# Patient Record
Sex: FEMALE | Race: WHITE
[De-identification: names, ages, dates, MRNs, and addresses within clinical notes are randomized per-mention and may not be internally consistent; named-entity substitution may affect disease eponyms.]

---

## 2017-02-05 NOTE — ED PHYSICIAN DOCUMENTATION
Dyspnea





- HISTORIAN


Historian: patient, child





- HPI


Chief Complaint: Dyspnea


Additional Information: 





[facility] sob 0200hrs w/inc anxiety hx chf-=concern re daughter moving.metal aortic 

valve repl 1-23-17 [facility] lance well-chf sig better since then.  had marked chf and 

leg edema prior-better since.


Duration: continues in ED, better


Initiating Event: other (aforementioned anxiety).  denies: upper respiratory 

illness, out of meds


Severity: moderate


Exacerbated By: exertion, laying flat


Associated Symptoms: denies: fever, sweating, chest pain, chest discomfort, 

bloody cough, productive cough, heart racing, leg pain





- ROS


CONST: other (as above)


EYES/ENT: none


GI/: none


NEURO/PSYCH: denies: headache


MS/SKIN/LYMPH: none, other (still eccymopsis s/p surgical proceedures)





- PAST HX


Lung Disease: none


Cardiac Disease: CHF, other (aortic valve)


PE Risk Factors: hypertension, recent surgery.  denies: hx of PE


Surgeries/Procedures: other (aortic valve repl)


Other History: diabetes Type 1, other (sjogrens syndrome depression anxiety).  

denies: CVA, kidney failure, TIA


Allergies/Adverse Reactions: 


 Allergies











Allergy/AdvReac Type Severity Reaction Status Date / Time


 


Sulfa (Sulfonamide Allergy Unknown Generalized Verified 02/05/17 11:31





Antibiotics)   Swelling  














Home Medications: 


 Ambulatory Orders











 Medication  Instructions  Recorded


 


Carvedilol [Coreg] 6.25 mg PO BID 02/05/17


 


Citalopram Hydrobromide 20 mg PO DAILY 02/05/17





[Citalopram HBr]  


 


Clopidogrel Bisulfate [Clopidogrel] 75 mg PO DAILY 02/05/17


 


Glimepiride [Amaryl] 2 mg PO 0730 02/05/17


 


Lisinopril [Lisinopril] 5 mg PO DAILY 02/05/17


 


Lorazepam [Lorazepam] 0.5 mg PO PRN PRN 02/05/17


 


Metformin HCl [Metformin HCl ER] 1,000 mg PO BID 02/05/17


 


Tramadol HCl [Ultram] 50 mg PO PRN PRN 02/05/17














- SOCIAL HX


Smoking History: non-smoker


Alcohol Use: none


Drug Use: none





- FAMILY HX


Family History: no significant history





- VITAL SIGNS


Vital Signs: 





 Vital Signs











Temp Pulse Resp BP Pulse Ox


 


    72      108/57   98 


 


    02/05/17 11:28     11/27/16 11:00  02/05/17 11:28














- REVIEWED ASSESSMENTS


Nursing Assessment  Reviewed: Yes


Vitals Reviewed: Yes





ED Results Lab/Radiology





- Radiology


Radiology Impressions: 





rt lung base atelectasis vs infiltrate





- Orders


Orders: 





 ED Orders











 Category Date Time Status


 


 Continuous EKG monitoring Q30M Care  02/05/17 11:28 Ordered


 


 Continuous Pulse Oximetry Q30M Care  02/05/17 11:28 Ordered


 


 Place Saline Lock/IV NOW Care  02/05/17 11:28 Ordered


 


 CHEST P.A.&LAT 2 VIEWS [RAD] Stat Exams  02/05/17 Ordered


 


 CBC/PLATELET/DIFF Routine Lab  02/05/17 11:28 Ordered


 


 CMP Routine Lab  02/05/17 11:28 Ordered


 


 CREATINE KINASE Routine Lab  02/05/17 11:28 Ordered


 


 NT-proBNP Stat Lab  02/05/17 Ordered


 


 TROPONIN I (cTnI) Stat Lab  02/05/17 11:28 Ordered


 


 Oxygen Daily Oxygen  02/05/17 11:30 Ordered


 


 Oxygen Daily Oxygen  02/05/17 11:30 Ordered


 


 EKG WITH COMPARISON Stat Ther  02/05/17 11:28 Ordered














Dyspnea Physical Exam





- EXAM


General Appearance: mild distress, moderate distress


EENT: eye inspection normal


Neck: No: nml inspection (eccymosis s/p surgery slight NVD)


Respiratory: rales (alonzo rt and post base).  No: breath sounds nml


Skin: color nml.  No: no rash, cyanosis, diaphoresis


Extremities: no edema (very slight)


Neuro/Psych: oriented x3, motor nml, sensation nml, mood/affect nml





Discharge


Clincal Impression: 


 Acute exacerbation of CHF (congestive heart failure), Dehydration, 

Uncontrolled diabetes mellitus





Referrals: 


Wyatt Montalvo MD [STAFF PHYSICIAN] - 2 Days


Home Medications: 


Ambulatory Orders





Carvedilol [Coreg] 6.25 mg PO BID 02/05/17 


Citalopram Hydrobromide [Citalopram HBr] 20 mg PO DAILY 02/05/17 


Clopidogrel Bisulfate [Clopidogrel] 75 mg PO DAILY 02/05/17 


Glimepiride [Amaryl] 2 mg PO 0730 02/05/17 


Lisinopril [Lisinopril] 5 mg PO DAILY 02/05/17 


Lorazepam [Lorazepam] 0.5 mg PO PRN PRN 02/05/17 


Metformin HCl [Metformin HCl ER] 1,000 mg PO BID 02/05/17 


Tramadol HCl [Ultram] 50 mg PO PRN PRN 02/05/17 








Comments: 





rev case w/ DR VILLANUEVA.  WILL SEND HOME MEDS ADJUSTMENT, DIETARY DISCRETION,  F/U 

SOON DR VILLANUEVA or rted


Condition: Good


Disposition: 01 HOME, SELF-CARE


Decision to Admit: NO


Decision Time: 13:20

## 2017-02-05 NOTE — DIAGNOSTIC IMAGING REPORT
[facility]

[id number] [facility].[address]. [address]

 

 

 

 

Report Submission Date: 2017 12:11:36 PM CST

Patient       Study

Name:   EVELIA JAIMES       Date:   2017 11:50:24 AM CST

MRN:          Modality Type:   CR

Gender:   F       Description:   CHEST

:   36       Institution:   [facility]

Physician:   AMINA LANTIGUA DO            

 

 

Chest 2 views 



History: Shortness of breath 



Findings: Cardiomegaly, aortic valve replacement, reverse right shoulder 
arthroplasty right hemidiaphragm eventration, and minimal right basilar 
atelectasis are observed. There is no confluent infiltrate or pleural effusion. 
Calcified granulomas are present. 



Impression: Postoperative changes, mild right basilar atelectasis, and 
cardiomegaly.

 

Electronically signed on 2017 12:11:36 PM CST by:

Manuel APARICIO

## 2017-02-13 NOTE — HISTORY AND PHYSICAL REPORT
History of Present Illnes





- History of Present Illness


Reason for Visit: atrial fib with RVR


History of Present Illness: 





Patient is a 77yo white female who presented to the office today with 

complaints of anxiety and fatigue. Patient states that since she has had her 

heart valve replaced she has not felt well. Seemed to be anxious all the time. 

Is not sleeping well. This AM she notice that her heart was fluttering at 

times. Denies any syncope or near syncope symptoms, chest pain/pressure, 

dyspnea or cough/wheezing. Patient was noted to have some tachycardia and 

irregular heart rate. Pulse to ascultation was 132. Patient was felt to be in 

atrial fib with RVR and admitted to the hospital for further evaluation and 

treatment. 





- Past Medical History


Cardiac: HTN, Hyperlipidemia, Aortic stenosis.  denies: CAD, CHF


Gastrointestinal: GERD


Heme/Onc: Other (DVT after hip surgery 9/14.)


Psych: Anxiety, Depression


Musculoskeletal: Osteoarthritis, Other (Sojogren's syndrome, s/p DVT)


Rheumatologic: Other (Sjogren syndrom, diverticulosis, )


Renal/: Other (kidney stones)


Endocrine: Diabetes, Other (diabetic neuropathy)





- Past Surgical History


Past Surgical History: Appendectomy, Cholecystectomy, Cataract Removal, Total 

Knee Replacement (bilaterally), Other (shoulder surgery, aortic valve 

replacement, lower back surgery, carpal tunnel release, AP bladder repair.)





- Past Social History


Smoke: No


Occupation: retired, manager of gas station


Alcohol: None


Drugs: None


Lives: With Family ([facility])





- Health Maintenance


Health Maintenance: Tetanus (2011), Pneumococcal Vaccine (2014), Mammogram (Aug 

11), DEXA


Pneumonia Vaccine: Yes


Resuscitation Status: 


Resusciation Status





Resuscitation Status             Full Code














- Unable to Obtain History


Unable to Obtain: No





Review of Systems





- Review of Systems


Constitutional: Weakness (generalized).  negative: Fever, Chills


Eyes: negative: pain, vision change


ENT: negative: Ear Pain, Ear Discharge, Nose Pain, Nose Discharge, Nose 

Congestion, Mouth Pain, Mouth Swelling, Throat Pain, Throat Swelling


Respiratory: Cough, Dry, Shortness of Breath.  negative: Hemoptysis, SOB with 

Excertion, Pleuritic Pain, Sputum, Wheezing


Cardiovascular: Palpitations.  negative: Chest Pain, Orthopnea, Paroxysmal Noc. 

Dyspnea, Edema, Light Headedness


Gastrointestinal: Constipation.  negative: Nausea, Vomiting, Abdominal Pain, 

Diarrhea, Melena, Hematochezia


Genitourinary: negative: Dysuria, Frequency, Hematuria


Musculoskeletal: Back Pain (mild).  negative: Neck Pain, Shoulder Pain, Arm Pain


Skin: negative: Rash


Neurological: negative: Weakness, Numbness, Incoordination, Change in Speech, 

Confusion, Seizures





- Medications/Allergies


Allergies/Adverse Reactions: 


 Allergies











Allergy/AdvReac Type Severity Reaction Status Date / Time


 


Sulfa (Sulfonamide Allergy Unknown Generalized Verified 02/13/17 12:06





Antibiotics)   Swelling  














Home Medications: 


 Home Medications





Aspirin [Heraclio] 81 mg PO DAILY 02/13/17 


Cholecalciferol [Vitamin D-3] 1,000 unit PO DAILY 02/13/17 


Cholecalciferol [Vitamin D-3] 2,000 unit PO HS 02/13/17 


Dexlansoprazole [Dexilant] 60 mg PO DAILY 02/13/17 


Docusate Sodium [Colace] 100 - 200 mg PO DAILY 02/13/17 


Furosemide [Lasix] 20 mg  02/13/17 


Gabapentin [Neurontin] 300 mg PO 09 02/13/17 


Gabapentin [Neurontin] 600 mg PO HS 02/13/17 


[place]-3S/Dha/Epa/Fish Oil [Fish Oil 1,200 mg Softgel] 1 each PO D 02/13/17 


Pravastatin Sodium [Pravachol] 80 mg PO HS 02/13/17 


Prenatal Vit/Iron Fumarate/FA [Prenatal Tablet] 1 each PO D 02/13/17 











Current Inpatient Medications: 


 Current Inpatient Medications





Carvedilol (Coreg)  6.25 mg PO BS [facility]


Citalopram Hydrobromide (Celexa)  20 mg PO DAILY [facility]


Clopidogrel Bisulfate (Plavix)  75 mg PO DAILY [facility]


Docusate Sodium (Colace)  200 mg PO DAILY [facility]


Enoxaparin Sodium (Lovenox)  30 mg SQ QD [facility]


   Stop: 02/26/17 13:01


Furosemide (Lasix)  20 mg  [facility]


Glimepiride (Amaryl)  2 mg PO 0730 [facility]


Lisinopril (Prinivil)  5 mg PO HS [facility]


Lorazepam (Ativan)  0.5 mg PO HS PRN


   PRN Reason: Anxiety


Metformin HCl (Glucophage)  1,000 mg PO [address] [facility]


Miscellaneous (Chem Sticks)  1 each  CHEMQID [facility]


Sodium Chloride (Normal Saline Flush)  3 ml IV BID [facility]


Tramadol HCl (Ultram)  50 mg PO Q4 PRN


   PRN Reason: PAIN














Exam





- Exam


Vital Signs: 


 Vital Signs (72 hours)











  02/13/17





  11:09


 


Temperature 97.9 F


 


Pulse Rate [ 79





Bilateral 





Radial] 


 


Pulse Rate [ 79





Left Pulse ox] 


 


Respiratory 18





Rate 


 


Blood Pressure 141/65





[Left Arm] 


 


O2 Sat by Pulse 97





Oximetry 














General: Alert, Oriented to Person, Oriented to Place, Oriented to Time, 

Cooperative, Mild distress


HEENT: Atraumatic, PERRLA, EOMI, Mouth Mucous membr. moist/Pink, Nose Mucous 

membr. moist/Pink, Dentition Normal, Hearing Grossly Normal


Neck: Normal Range of Motion


Carotids: 





WNL


Thyroid: 





WNL


Lungs: Clear to auscultation, Normal air movement, Speaks full Sentences.  No: 

Wheezes, Rales, Rhonchi, Stridor


Cardiovascular: Normal S1, Normal S2, Murmur, Irregularly Irregular, Tachycardia

, Atrial Fib


Murmur: Systolic Murmur


Heart Murmur Grade: II


Abdomen: Normal bowel sounds, Soft, No tenderness, No hepatospenomegaly, No 

masses.  No: Distended


Integumentary: Normal, Pink, Warm, Dry


Extremities: No clubbing, No cyanosis, No edema, Normal pulses, No tenderness/

swelling


Neurological: Normal gait, Normal speech, Strength Equal Bilat, Normal tone, 

Sensation intact, Cranial nerves 3-12 NL, Reflexes 2+


Psych/Mental Status: Mental status NL, Appropriate Affect, Intact Judgment.  No

: Mood NL (anxious, depression)





Assessment/Plan





- Assessment/Plan


(1) Atrial fibrillation


Status: Acute   Current Visit: Yes   


Assessment: 


CHAD2 score of 3, will start anticoagulation. Patient has been advised of the 

risk verses benefits of the anticoalgulation including spontaneous bleeding, 

CVA and death. Patient has taken Xarelto previously and is aware of the risks. 

Will get a cardiac consultation for further evaulation








(2) Diabetes mellitus type 2


Status: Chronic   Current Visit: No   


Assessment: 


Will continue with present home meds and monitor BS








(3) History of deep venous thrombosis


Status: Resolved   Current Visit: No   


Assessment: 


DVT prophylaxis








(4) Sjogren's syndrome


Status: Chronic   Current Visit: No   


Assessment: 


stable








(5) Aortic valve disorder


Status: Resolved   Current Visit: Yes   


Assessment: 


continue to monitor








(6) Anxiety and depression


Status: Acute   Current Visit: Yes   


Assessment: 


will start citalopram and consider some counseling. 








VTE Assessment





- RISK FACTOR SCORE


VTE RISK FACTOR SCORES: AGE OVER 60 YEARS, ANTICIPATED BED CONFINEMENT OR 

IMMOBILIZATION > 24 HOURS, DOCUMENTED HX OF VTE





- RISK


VTE HIGH RISK: SCORE OF 3-4 (RISK PROXIMAL DVT 4-8%)  PROPHYLAXIS NEEDED (On 

eliquis)

## 2017-02-13 NOTE — DIAGNOSTIC IMAGING REPORT
[facility] 

[facility]

[id number] Counts include 234 beds at the [facility] [address] [address]

 

 

 

 

Report Submission Date: 2017 1:20:05 PM CST

Patient       Study

Name:   EVELIA JAIMES       Date:   2017 1:09:59 PM CST

MRN:          Modality Type:   CR

Gender:   F       Description:   CHEST

:   36       Institution:   [facility]

Physician:   [facility]

         

 

 

Chest 2 views 



History: Atrial fibrillation and aortic valve replacement last month 



Findings: The cardiac silhouette is enlarged. Right shoulder replacement, 
minimal right base atelectasis or scar, calcified right thoracic granulomas, 
and percutaneous aortic valve replacement are observed. A mild lower thoracic 
compression deformity is present. Pulmonary vascularity is normal. There is no 
infiltrate or pleural effusion. There has been no significant change since the 
2017 exam. 



Impression: No significant change.

 

Electronically signed on 2017 1:20:05 PM CST by:

Manuel APARICIO

## 2017-02-14 NOTE — INPATIENT PROGRESS NOTE
Subjective





- Required Recertification Statement


I anticipate X number of days because-include discharge plan: 1 day





- Review of Systems


Events since last encounter: 





Patient is doing some better. Still feels weak and is having some gait problems

, feels that she may fall.  DM has been stable. HTN is stable


Pulmonary: Denies: Dyspnea, Cough


Cardiovascular: Palpitations.  Denies: Chest Pain


Gastrointestinal: Denies: Nausea, Vomiting, Abdominal Pain





Objective





- Exam


Vitals and I&O: 


 Vital Signs











Temp  98.4 F   02/14/17 06:00


 


Pulse  76   02/14/17 06:00


 


Resp  16   02/14/17 06:00


 


BP  98/53   02/14/17 06:00


 


Pulse Ox  94   02/14/17 06:00











 Intake & Output











 02/13/17 02/13/17 02/14/17





 11:59 23:59 11:59


 


Intake Total  660 


 


Output Total   600


 


Balance  660 -600


 


Weight 79.379 kg  


 


Intake:   


 


  Oral  660 


 


Output:   


 


  Urine   600


 


Other:   


 


  Voiding Method  Toilet Toilet


 


  # Voids  3 

















- Results


Results: 


 Laboratory Results











WBC  5.10 K/ul (4.00-12.00)   02/13/17  13:05    


 


RBC  4.01 M/ul (3.90-5.20)   02/13/17  13:05    


 


Hgb  11.3 g/dL (12.0-16.0)  L  02/13/17  13:05    


 


Hct  35.3 % (34.5-46.5)   02/13/17  13:05    


 


MCV  88.0 fl (80.0-100.0)   02/13/17  13:05    


 


MCH  28.1 pg (28.0-34.0)   02/13/17  13:05    


 


MCHC  31.9 g/dL (30.0-36.0)   02/13/17  13:05    


 


RDW  14.2 % (11.3-14.3)   02/13/17  13:05    


 


Plt Count  153 K/mm3 (130-400)   02/13/17  13:05    


 


Neut % (Auto)  75.1 % (39.0-79.0)   02/13/17  13:05    


 


Lymph % (Auto)  18.6 % (16.0-50.0)   02/13/17  13:05    


 


Mono % (Auto)  3.7 % (0.0-11.0)   02/13/17  13:05    


 


Eos % (Auto)  1.0 % (0.0-6.8)   02/13/17  13:05    


 


Baso % (Auto)  0.3  (0.0-1.5)   02/13/17  13:05    


 


Neut #  3.8 # k/uL (1.4-7.7)   02/13/17  13:05    


 


Lymph #  1.0 # k/uL (0.6-4.0)   02/13/17  13:05    


 


Mono #  0.2 # k/uL (0.0-0.9)   02/13/17  13:05    


 


Eos #  0.0 # k/uL (0.0-0.6)   02/13/17  13:05    


 


Baso #  0.0 # k/uL (0.0-0.5)   02/13/17  13:05    


 


Reactive Lymphs %  1.4 % (0.0-5.0)   02/13/17  13:05    


 


Reactive Lymphs #  0.1 # k/uL (0.0-0.8)   02/13/17  13:05    


 


Sodium  139 mmol/L (136-145)   02/13/17  13:05    


 


Potassium  4.5 mmol/L (3.5-5.0)   02/13/17  13:05    


 


Chloride  98 mmol/L ()   02/13/17  13:05    


 


Carbon Dioxide  29 mmol/L (20-32)   02/13/17  13:05    


 


BUN  50 mg/dL (10-26)  H  02/13/17  13:05    


 


Creatinine  1.1 mg/dL (0.4-1.5)   02/13/17  13:05    


 


Estimated Creat Clear  62   02/13/17  13:05    


 


Est GFR ( Amer)  > 60  (60-)  02/13/17  13:05    


 


Est GFR (Non-Af Amer)  > 60  (60-)  02/13/17  13:05    


 


Glucose  255 mg/dL (70-99)  H  02/13/17  13:05    


 


Calcium  9.9 mg/dL (8.5-10.5)   02/13/17  13:05    


 


Total Bilirubin  0.3 mg/dL (0.2-1.2)   02/13/17  13:05    


 


AST  20 U/L (0-41)   02/13/17  13:05    


 


ALT  15 U/L (0-45)   02/13/17  13:05    


 


Alkaline Phosphatase  49 U/L ()   02/13/17  13:05    


 


Troponin I  < 0.03 ng/mL (0.03-0.06)  L  02/13/17  13:05    


 


Total Protein  8.5 g/dL (6.0-8.5)   02/13/17  13:05    


 


Albumin  4.6 g/dL (3.0-5.5)   02/13/17  13:05    


 


TSH  2.660 uIU/mL (0.270-4.200)   02/13/17  13:05    

















Assessment/Plan





- Assessment/Plan


(1) PVC (premature ventricular contraction)


Status: Acute   


Assessment: 


Patient seems to be doing some better, less frequent on monitor. Will have 

cardiology see, recent Aortic valve replaced.








(2) Diabetes mellitus type 2


Status: Chronic   


Assessment: 


stable











(3) Sjogren's syndrome


Status: Chronic   





(4) Aortic valve disorder


Status: Resolved   


Assessment: 


stable











(5) Anxiety and depression


Status: Acute   


Assessment: 


stable

## 2017-02-15 NOTE — DISCHARGE SUMMARY
Discharge Summary





- Discharge Sumary


History of Present Illness: 


Patient is a 77yo white female who presented to the office today with 

complaints of anxiety and fatigue. Patient states that since she has had her 

heart valve replaced she has not felt well. Seemed to be anxious all the time. 

Is not sleeping well. This AM she notice that her heart was fluttering at 

times. Denies any syncope or near syncope symptoms, chest pain/pressure, 

dyspnea or cough/wheezing. Patient was noted to have some tachycardia and 

irregular heart rate. Pulse to ascultation was 132. Patient was felt to be in 

atrial fib with RVR and admitted to the hospital for further evaluation and 

treatment. 





Home Medications: 


 Ambulatory Orders











 Medication  Instructions  Recorded


 


Carvedilol [Coreg] 6.25 mg PO BS 02/05/17


 


Citalopram Hydrobromide 20 mg PO DAILY 02/05/17





[Citalopram HBr]  


 


Clopidogrel Bisulfate [Clopidogrel] 75 mg PO DAILY 02/05/17


 


Lisinopril 5 mg PO HS 02/05/17


 


Lorazepam 0.5 mg PO HS PRN 02/05/17


 


Tramadol HCl [Ultram] 50 - 100 mg PO Q4 PRN 02/05/17


 


Aspirin [Heraclio] 81 mg PO DAILY 02/13/17


 


Dexlansoprazole [Dexilant] 60 mg PO DAILY 02/13/17


 


Docusate Sodium [Colace] 100 - 200 mg PO DAILY 02/13/17


 


Gabapentin [Neurontin] 300 mg PO 09 02/13/17


 


Gabapentin [Neurontin] 600 mg PO HS 02/13/17


 


[place]-3S/Dha/Epa/Fish Oil [Fish 1 each PO D 02/13/17





Oil 1,200 mg Softgel]  


 


Pravastatin Sodium [Pravachol] 80 mg PO HS 02/13/17


 


Prenatal Vit/Iron Fumarate/FA 1 each PO D 02/13/17





[Prenatal Tablet]  


 


Ergocalciferol (Vitamin D2) 400 unit PO D 02/15/17





[Vitamin D]  


 


Ondansetron HCl Rapdis [Zofran ODT] 4 mg PO Q8 PRN 02/25/17


 


Prednisone 5 mg PO AM 02/25/17


 


predniSONE [Deltasone] 2.5 mg PO PM 02/25/17


 


Furosemide [Lasix] 20 mg PO D #0  02/27/17











Allergies/Adverse Reactions: 


 Allergies











Allergy/AdvReac Type Severity Reaction Status Date / Time


 


Sulfa (Sulfonamide Allergy Unknown Generalized Verified 02/25/17 13:10





Antibiotics)   Swelling  











Discharge Summary: 





Patient was felt to possibly be in atrial fib with a RVR. When hooked up to 

monitor it was felt that she was in a normal sinus rhythm with frequent PACs.  

Patient was seen by Dr Albarran, orders were reviewed. DM remained stable without 

any hyper or hypoglycemic problems. HTN remained stable. ECHO was normal.  

Patient seem to be doing better on the day of discharge and was discharged in 

stable condition. 





- Final Diagnosis


(1) Sinus arrhythmia


Problems: 


stable rate








(2) Diabetes mellitus type 2


Problems: 


stable on home meds








(3) Sjogren's syndrome


Problems: 


stable, no change








(4) Aortic valve disorder


Problems: 


stable








(5) Anxiety and depression


Problems: 


will start Ativan prn

## 2017-02-24 NOTE — ED PHYSICIAN DOCUMENTATION
General Adult





- HISTORIAN


Historian: patient





- HPI


Stated Complaint: back pain, nausea, confusion per family


Chief Complaint: General Adult


Additional Information: 





single episode of confusion at 3 pm which resolved spontaneously


Onset: hours (4)


Timing: better, gone now


Severity: moderate


Modifying Factors: has recurring problem with uti and associated confusion


Context: was driving in car


Quality: moderate


Further Comments: yes (vomited x 1)


Last known Well Date: 02/23/17


Last Known Well Time: 14:00





- ROS


CONST: no problems


EYES/ENT: none


CVS/RESP: none


GI/: denies: abdominal pain, problems urinating, vomiting, nausea, diarrhea, 

black stools


MS/SKIN/LYMPH: none


NEURO/PSYCH: denies: headache, fainting, dizziness, tingling, numbness, 

difficulty walking, difficulty with speech, anxiety, depression





- PAST HX


Past History: hypertension, other (uti)


Other History: other (gerd, hyperlipidemia)


Surgeries/Procedures: other (ortho)


Immunizations: referred to PCP


Allergies/Adverse Reactions: 


 Allergies











Allergy/AdvReac Type Severity Reaction Status Date / Time


 


Sulfa (Sulfonamide Allergy Unknown Generalized Verified 02/23/17 18:40





Antibiotics)   Swelling  














Home Medications: 


 Ambulatory Orders











 Medication  Instructions  Recorded


 


Carvedilol [Coreg] 6.25 mg PO BS 02/05/17


 


Citalopram Hydrobromide 20 mg PO DAILY 02/05/17





[Citalopram HBr]  


 


Clopidogrel Bisulfate [Clopidogrel] 75 mg PO DAILY 02/05/17


 


Glimepiride [Amaryl] 2 mg PO 0730 02/05/17


 


Lisinopril 5 mg PO HS 02/05/17


 


Lorazepam 0.5 mg PO HS PRN 02/05/17


 


Metformin HCl [Metformin HCl ER] 1,000 mg PO 0717 02/05/17


 


Tramadol HCl [Ultram] 50 - 100 mg PO Q4 PRN 02/05/17


 


Aspirin [Heraclio] 81 mg PO DAILY 02/13/17


 


Dexlansoprazole [Dexilant] 60 mg PO DAILY 02/13/17


 


Docusate Sodium [Colace] 100 - 200 mg PO DAILY 02/13/17


 


Furosemide [Lasix] 20 mg  02/13/17


 


Gabapentin [Neurontin] 300 mg PO 09 02/13/17


 


Gabapentin [Neurontin] 600 mg PO HS 02/13/17


 


[place]-3S/Dha/Epa/Fish Oil [Fish 1 each PO D 02/13/17





Oil 1,200 mg Softgel]  


 


Pravastatin Sodium [Pravachol] 80 mg PO HS 02/13/17


 


Prenatal Vit/Iron Fumarate/FA 1 each PO D 02/13/17





[Prenatal Tablet]  


 


Ergocalciferol (Vitamin D2) 400 unit PO D 02/15/17





[Vitamin D]  














- SOCIAL HX


Smoking History: non-smoker


Alcohol Use: none


Drug Use: none





- FAMILY HX


Family History: No





- VITAL SIGNS


Vital Signs: 





 Vital Signs











Temp Pulse Resp BP Pulse Ox


 


 98.4 F   106 H  16   121/54   91 L


 


 02/23/17 19:25  02/23/17 19:25  02/23/17 19:25  02/23/17 19:25  02/23/17 19:25














- REVIEWED ASSESSMENTS


Nursing Assessment  Reviewed: Yes


Vitals Reviewed: Yes





Progress





- Results/Orders


Results/Orders: 





ua ordered





- Progress


Progress: 





pt given 1 gram rocephin im and zofran 8 mg p.o. in er





Critical Care Note





- Critical Care Note


Total Time (mins): 0





ED Results Lab/Radiology





- Lab Results


Lab Results: 





ua shows uti





- Radiology


Radiology Impressions: 





none ordered





- Orders


Orders: 





 ED Orders











 Category Date Time Status


 


 Ondansetron HCl/Pf [Zofran 4 mg/2 ml] Med  02/23/17 19:09 Discontinued





 8 mg IM NOW ONE   


 


 cefTRIAXone SODIUM [Rocephin] Med  02/23/17 19:08 Discontinued





 1 gm IM NOW ONE   














General Adult Physical Exam





- PHYSICAL EXAM


GENERAL APPEARANCE: no distress


EENT: eye inspection normal, ENT inspection normal, pharynx normal, no signs of 

dehydration


NECK: normal inspection, thyroid normal, supple


RESPIRATORY: no resp distress, chest non-tender, breath sounds normal


CVS: reg rate & rhythm, heart sounds normal, equal pulses, no murmur, no gallop

, PMI nml, no JVD


ABDOMEN: soft, no organomegaly, normal bowel sounds, no abdominal bruit, no 

distension, non-tender


BACK: no CVA tenderness, other (l5-s1 tenderness)


SKIN: warm/dry, normal color


EXTREMITIES: non-tender, normal range of motion, no evidence of injury, no edema


NEURO: oriented X3, CN's nml as tested, motor nml, sensation nml, mood/affect 

nml, cognition normal





Discharge


Clincal Impression: 


Urinary tract infection


Qualifiers:


 Urinary tract infection type: acute cystitis Hematuria presence: without 

hematuria Qualified Code(s): N30.00 - Acute cystitis without hematuria





Referrals: 


Raphael Nieto MD [Primary Care Provider] - 2 Days


Additional Instructions: 


increase fluids


Home Medications: 


Ambulatory Orders





Carvedilol [Coreg] 6.25 mg PO BS 02/05/17 


Citalopram Hydrobromide [Citalopram HBr] 20 mg PO DAILY 02/05/17 


Clopidogrel Bisulfate [Clopidogrel] 75 mg PO DAILY 02/05/17 


Glimepiride [Amaryl] 2 mg PO 0730 02/05/17 


Lisinopril 5 mg PO HS 02/05/17 


Lorazepam 0.5 mg PO HS PRN 02/05/17 


Metformin HCl [Metformin HCl ER] 1,000 mg PO 0717 02/05/17 


Tramadol HCl [Ultram] 50 - 100 mg PO Q4 PRN 02/05/17 


Aspirin [Heraclio] 81 mg PO DAILY 02/13/17 


Dexlansoprazole [Dexilant] 60 mg PO DAILY 02/13/17 


Docusate Sodium [Colace] 100 - 200 mg PO DAILY 02/13/17 


Furosemide [Lasix] 20 mg  02/13/17 


Gabapentin [Neurontin] 300 mg PO 09 02/13/17 


Gabapentin [Neurontin] 600 mg PO HS 02/13/17 


[place]-3S/Dha/Epa/Fish Oil [Fish Oil 1,200 mg Softgel] 1 each PO D 02/13/17 


Pravastatin Sodium [Pravachol] 80 mg PO HS 02/13/17 


Prenatal Vit/Iron Fumarate/FA [Prenatal Tablet] 1 each PO D 02/13/17 


Ergocalciferol (Vitamin D2) [Vitamin D] 400 unit PO D 02/15/17 








Comments: 





discharged with script for levaquin 500 mg 1 p.o. daily x 7 days


Condition: Stable


Disposition: 01 HOME, SELF-CARE


Decision to Admit: NO


Decision Time: 19:20

## 2017-02-25 NOTE — ED PHYSICIAN DOCUMENTATION
Nausea/Vomiting/Diarrhea





- HISTORIAN


Historian: patient





- HPI


Chief Complaint: Nausea,Vomiting,Diarrhea


Onset: days ago (2-3 days)


Duration: constant


Context: denies: out of country travel, bad food


Severity: moderate


Further Comments: yes (Patient has been diagnosed with UTI on Thurs. Has been 

having some nauesa and vomiiting since Thurs. Urinary output has been 

decreased. No abd pain noted. No fever or chills noted. Has been having some 

diaphoresis. Has been able to keep her meds down. Has been having some 

orthostatic symptoms.  No other family members sick. Has been having some 

palpitations.  Mouth has been getting dry.  Gutierrez vomited 4 times today. Has not 

been able to keep anything down. No preciptating or modifying factors.)





- ROS


CONST: sweating.  denies: fever, chills


CVS/RESP: denies: chest pain


GI/: denies: constipation, black stools, bloody urine, bloody stools





- PAST HX


Past History: diabetes Type 2, other (aortic valve stenosis, GERDs, 

hyperlipidemia, )


Surgeries/Procedures: appendectomy, cholecystectomy, other (cataract, bilateral 

total knee replacement, shoulder surgery, Aortic valve replacement)


Immunizations: UTD, referred to PCP


Allergies/Adverse Reactions: 


 Allergies











Allergy/AdvReac Type Severity Reaction Status Date / Time


 


Sulfa (Sulfonamide Allergy Unknown Generalized Verified 02/25/17 13:10





Antibiotics)   Swelling  














Home Medications: 


 Ambulatory Orders











 Medication  Instructions  Recorded


 


Carvedilol [Coreg] 6.25 mg PO BS 02/05/17


 


Citalopram Hydrobromide 20 mg PO DAILY 02/05/17





[Citalopram HBr]  


 


Clopidogrel Bisulfate [Clopidogrel] 75 mg PO DAILY 02/05/17


 


Glimepiride [Amaryl] 1 mg PO 0730 02/05/17


 


Lisinopril 5 mg PO HS 02/05/17


 


Lorazepam 0.5 mg PO HS PRN 02/05/17


 


Metformin HCl [Metformin HCl ER] 1,000 mg PO 0717 02/05/17


 


Tramadol HCl [Ultram] 50 - 100 mg PO Q4 PRN 02/05/17


 


Aspirin [Heraclio] 81 mg PO DAILY 02/13/17


 


Dexlansoprazole [Dexilant] 60 mg PO DAILY 02/13/17


 


Docusate Sodium [Colace] 100 - 200 mg PO DAILY 02/13/17


 


Furosemide [Lasix] 20 mg  02/13/17


 


Gabapentin [Neurontin] 300 mg PO 09 02/13/17


 


Gabapentin [Neurontin] 600 mg PO HS 02/13/17


 


[place]-3S/Dha/Epa/Fish Oil [Fish 1 each PO D 02/13/17





Oil 1,200 mg Softgel]  


 


Pravastatin Sodium [Pravachol] 80 mg PO HS 02/13/17


 


Prenatal Vit/Iron Fumarate/FA 1 each PO D 02/13/17





[Prenatal Tablet]  


 


Ergocalciferol (Vitamin D2) 400 unit PO D 02/15/17





[Vitamin D]  


 


Levofloxacin [Levaquin] 500 mg PO DAILY 02/25/17


 


Ondansetron HCl Rapdis [Zofran Odt] 4 mg PO Q8 PRN 02/25/17


 


Prednisone 5 mg PO AM 02/25/17


 


predniSONE [Deltasone] 2.5 mg PO PM 02/25/17














- SOCIAL HX


Smoking History: non-smoker


Alcohol Use: none


Drug Use: none





- FAMILY HX


Family History: none





- VITAL SIGNS


Vital Signs: 


 Vital Signs











Temp Pulse Resp BP Pulse Ox


 


 96.8 F L  72   18   101/68   98 


 


 02/25/17 14:20  02/25/17 14:30  02/25/17 14:30  02/25/17 14:30  02/25/17 14:30














- REVIEWED ASSESSMENTS


Nursing Assessment  Reviewed: Yes


Vitals Reviewed: Yes





Progress





- EKG/XRAY/CT


EKG: NSR


Comments: occasional PVC, old anterior MI





ED Results Lab/Radiology





- Lab Results


Lab Results: 


 Lab Results











  02/25/17 02/25/17





  13:33 13:33


 


WBC    8.10 K/ul K/ul





    (4.00-12.00) 


 


RBC    3.68 M/ul L M/ul





    (3.90-5.20) 


 


Hgb    10.2 g/dL L g/dL





    (12.0-16.0) 


 


Hct    33.1 % L %





    (34.5-46.5) 


 


MCV    90.0 fl fl





    (80.0-100.0) 


 


MCH    27.7 pg L pg





    (28.0-34.0) 


 


MCHC    30.8 g/dL g/dL





    (30.0-36.0) 


 


RDW    14.2 % %





    (11.3-14.3) 


 


Plt Count    158 K/mm3 K/mm3





    (130-400) 


 


Neut % (Auto)    80.0 % H %





    (39.0-79.0) 


 


Lymph % (Auto)    15.0 % L %





    (16.0-50.0) 


 


Mono % (Auto)    2.9 % %





    (0.0-11.0) 


 


Eos % (Auto)    0.8 % %





    (0.0-6.8) 


 


Baso % (Auto)    0.2 





    (0.0-1.5) 


 


Neut #    6.4 # k/uL # k/uL





    (1.4-7.7) 


 


Lymph #    1.2 # k/uL # k/uL





    (0.6-4.0) 


 


Mono #    0.2 # k/uL # k/uL





    (0.0-0.9) 


 


Eos #    0.1 # k/uL # k/uL





    (0.0-0.6) 


 


Baso #    0.0 # k/uL # k/uL





    (0.0-0.5) 


 


Reactive Lymphs %    1.1 % %





    (0.0-5.0) 


 


Reactive Lymphs #    0.1 # k/uL # k/uL





    (0.0-0.8) 


 


Sodium  136 mmol/L mmol/L  





   (136-145)  


 


Potassium  4.9 mmol/L mmol/L  





   (3.5-5.0)  


 


Chloride  94 mmol/L L mmol/L  





   ()  


 


Carbon Dioxide  31 mmol/L mmol/L  





   (20-32)  


 


BUN  69 mg/dL H mg/dL  





   (10-26)  


 


Creatinine  3.4 mg/dL H mg/dL  





   (0.4-1.5)  


 


Estimated Creat Clear  20   





   


 


Est GFR ( Amer)  17  L   





  (60 - ) 


 


Est GFR (Non-Af Amer)  14  L   





  (60 - ) 


 


Glucose  122 mg/dL H mg/dL  





   (70-99)  


 


Calcium  9.7 mg/dL mg/dL  





   (8.5-10.5)  


 


Total Bilirubin  0.3 mg/dL mg/dL  





   (0.2-1.2)  


 


AST  30 U/L U/L  





   (0-41)  


 


ALT  21 U/L U/L  





   (0-45)  


 


Alkaline Phosphatase  46 U/L U/L  





   ()  


 


Total Protein  8.2 g/dL g/dL  





   (6.0-8.5)  


 


Albumin  4.3 g/dL g/dL  





   (3.0-5.5)  


 


Amylase  62 U/L U/L  





   ()  














- Orders


Orders: 


 ED Orders











 Category Date Time Status


 


 Orthostatics 1T Care  02/25/17 12:50 Active


 


 Place Saline Lock/IV Now Care  02/25/17 12:50 Active


 


 ABDOMEN COMPLETE [RAD] Stat Exams  02/25/17 Taken


 


 AMYLASE Routine Lab  02/25/17 13:33 Completed


 


 CBC/PLATELET/DIFF Routine Lab  02/25/17 13:33 Completed


 


 CMP Routine Lab  02/25/17 13:33 Completed


 


 URINALYSIS Routine Lab  02/25/17 Uncollected


 


 0.9 % Sodium Chloride [Normal Saline] 1,000 ml Med  02/25/17 13:30 Ordered





 IV .Q10H   


 


 Ondansetron HCl/Pf [Zofran 4 mg/2 ml] Med  02/25/17 12:54 Discontinued





 4 mg .ROUTE .STK-MED ONE   


 


 Ondansetron HCl/Pf [Zofran 4 mg/2 ml] Med  02/25/17 13:02 Discontinued





 4 mg IVP NOW ONE   


 


 EKG WITH COMPARISON Routine Ther  02/25/17 Ordered














Nausea Physical Exam





- EXAM


General Appearance: alert, moderate distress


EENT: pharynx normal, dry mucous membranes.  No: no signs of dehydration (dry 

mucous memebranes)


Neck: normal inspection, thyroid normal, supple


Respiratory: no resp distress, chest non-tender, breath sounds normal.  No: 

wheezes, rales, rhonchi


CVS: reg rate & rhythm, heart sounds normal, equal pulses, no murmur, no gallop


Abdomen: non-tender, no organomegaly, tenderness


Back: non-tender, painless ROM, vertebral point-tendernes


Skin: warm/dry, normal color


Extremities: non-tender, normal range of motion, no evidence of injury


Neuro/Psych: oriented X3, CN's nml as tested, motor nml, sensation nml, 

cognition normal.  No: mood/affect nml (depressed)





Discharge


Clincal Impression: 


 Dehydration symptoms, Diabetes mellitus type 2





Urinary tract infection


Qualifiers:


 Urinary tract infection type: acute cystitis Hematuria presence: without 

hematuria Qualified Code(s): N30.00 - Acute cystitis without hematuria





Home Medications: 


Ambulatory Orders





Carvedilol [Coreg] 6.25 mg PO BS 02/05/17 


Citalopram Hydrobromide [Citalopram HBr] 20 mg PO DAILY 02/05/17 


Clopidogrel Bisulfate [Clopidogrel] 75 mg PO DAILY 02/05/17 


Glimepiride [Amaryl] 1 mg PO 0730 02/05/17 


Lisinopril 5 mg PO HS 02/05/17 


Lorazepam 0.5 mg PO HS PRN 02/05/17 


Metformin HCl [Metformin HCl ER] 1,000 mg PO 0717 02/05/17 


Tramadol HCl [Ultram] 50 - 100 mg PO Q4 PRN 02/05/17 


Aspirin [Heraclio] 81 mg PO DAILY 02/13/17 


Dexlansoprazole [Dexilant] 60 mg PO DAILY 02/13/17 


Docusate Sodium [Colace] 100 - 200 mg PO DAILY 02/13/17 


Furosemide [Lasix] 20 mg  02/13/17 


Gabapentin [Neurontin] 300 mg PO 09 02/13/17 


Gabapentin [Neurontin] 600 mg PO HS 02/13/17 


[place]-3S/Dha/Epa/Fish Oil [Fish Oil 1,200 mg Softgel] 1 each PO D 02/13/17 


Pravastatin Sodium [Pravachol] 80 mg PO HS 02/13/17 


Prenatal Vit/Iron Fumarate/FA [Prenatal Tablet] 1 each PO D 02/13/17 


Ergocalciferol (Vitamin D2) [Vitamin D] 400 unit PO D 02/15/17 


Levofloxacin [Levaquin] 500 mg PO DAILY 02/25/17 


Ondansetron HCl Rapdis [Zofran Odt] 4 mg PO Q8 PRN 02/25/17 


Prednisone 5 mg PO AM 02/25/17 


predniSONE [Deltasone] 2.5 mg PO PM 02/25/17 








Condition: Stable


Disposition: 01 HOME, SELF-CARE


Decision to Admit: [id number]


Date of Decison to Admit: 02/25/17


Decision Time: 14:10

## 2017-02-25 NOTE — HISTORY AND PHYSICAL REPORT
History of Present Illnes





- History of Present Illness


Reason for Visit: Nausea/vomiting


History of Present Illness: 


Patient is a 75yo white female with a 3 day history of nausea associated with 

some vomiting over the last three days. Patient states that she has vomited 4 

times today. Nausea is made worse with taking Levaquin and eating. Will vomit 

within minutes post eating or taking medication.  Patient states that she has 

had some low-grade fever and chills.  Patient denies any hematemesis or melena.

  The patient recently was diagnosed with urinary tract infection and was 

started on Levaquin 500 mg by mouth daily.  Patient states that she feels that 

she is getting dehydrated.  Patient has been having some mild orthostatic 

symptoms.  Patient subsequently came to the ED for evaluation. 





- Past Medical History


Cardiac: HTN, Hyperlipidemia, Aortic stenosis.  denies: CAD, CHF


Gastrointestinal: GERD


Heme/Onc: Other (DVT after hip surgery 9/14.)


Psych: Anxiety, Depression


Musculoskeletal: Osteoarthritis, Other (Sojogren's syndrome, s/p DVT)


Rheumatologic: Other (Sjogren syndrom, diverticulosis, )


Renal/: Other (kidney stones)


Endocrine: Diabetes, Other (diabetic neuropathy)





- Past Surgical History


Past Surgical History: Appendectomy, Cholecystectomy, Cataract Removal, Total 

Knee Replacement (bilaterally), Other (shoulder surgery, aortic valve 

replacement, lower back surgery, carpal tunnel release, AP bladder repair.)





- Past Social History


Smoke: No


Occupation: retired, manager of gas station


Alcohol: None


Drugs: None


Lives: With Family ([facility])





- Health Maintenance


Health Maintenance: Tetanus (2011), Pneumococcal Vaccine (2014), Mammogram (Aug 

11), DEXA


Influenza Vaccine: Current for this Influenza Season


Pneumonia Vaccine: Yes


Resuscitation Status: 


Resusciation Status





Resuscitation Status             Full Code














- Unable to Obtain History


Unable to Obtain: No





Review of Systems





- Review of Systems


Constitutional: Fever, Chills, Sweats, Weakness.  negative: Malaise


Eyes: negative: pain, vision change, conjunctivae inflammation


ENT: negative: Ear Pain, Ear Discharge, Nose Pain, Nose Discharge, Nose 

Congestion, Mouth Pain, Mouth Swelling, Throat Pain, Throat Swelling


Respiratory: Shortness of Breath (mild), SOB with Excertion.  negative: Cough, 

Dry, Hemoptysis, Sputum, Wheezing


Cardiovascular: Light Headedness.  negative: Chest Pain, Palpitations, Orthopnea

, Paroxysmal Noc. Dyspnea, Edema


Gastrointestinal: Nausea, Vomiting, Abdominal Pain.  negative: Diarrhea, 

Constipation, Melena, Hematochezia


Genitourinary: Incontinence.  negative: Dysuria, Frequency, Hematuria, Retention


Musculoskeletal: Back Pain.  negative: Neck Pain, Shoulder Pain


Skin: negative: Rash, Lesions, Jaundice


Neurological: negative: Weakness, Numbness, Incoordination, Change in Speech, 

Confusion, Seizures





- Medications/Allergies


Allergies/Adverse Reactions: 


 Allergies











Allergy/AdvReac Type Severity Reaction Status Date / Time


 


Sulfa (Sulfonamide Allergy Unknown Generalized Verified 02/25/17 13:10





Antibiotics)   Swelling  














Home Medications: 


 Home Medications





Levofloxacin [Levaquin] 500 mg PO DAILY 02/25/17 


Ondansetron HCl Rapdis [Zofran Odt] 4 mg PO Q8 PRN 02/25/17 


Prednisone 5 mg PO AM 02/25/17 


predniSONE [Deltasone] 2.5 mg PO PM 02/25/17 











Current Inpatient Medications: 


 Current Inpatient Medications





Aspirin (Aspirin)  81 mg PO DAILY [facility]


Carvedilol (Coreg)  6.25 mg PO BS [facility]


Citalopram Hydrobromide (Celexa)  20 mg PO DAILY [facility]


Clopidogrel Bisulfate (Plavix)  75 mg PO DAILY [facility]


Docusate Sodium (Colace)  100 mg PO DAILY [facility]


Enoxaparin Sodium (Lovenox)  30 mg SQ QD [facility]


   Stop: 03/10/17 15:01


Gabapentin (Neurontin)  300 mg PO 09 [facility]


Gabapentin (Neurontin)  600 mg PO HS [facility]


Glimepiride (Amaryl)  1 mg PO 0730 [facility]


Sodium Chloride (Normal Saline)  1,000 mls @ 500 mls/hr IV .Q10H [facility]


   Last Admin: 02/25/17 13:30 Dose:  500 mls/hr


Sodium Chloride (Normal Saline)  1,000 mls @ 125 mls/hr IV Q10H [facility]


Levofloxacin/Dextrose 500 mg/ (PREMIX BAG)  100 mls @ 100 mls/hr IV DAILY [facility]


   Stop: 03/11/17 15:29


Lisinopril (Prinivil)  5 mg PO HS [facility]


Lorazepam (Ativan)  0.5 mg PO HS PRN


   PRN Reason: Anxiety


Metformin HCl (Glucophage)  1,000 mg PO [address] [facility]


Multivitamins (Tab-A-Berta)  1 each PO DAILY [facility]


Ondansetron HCl (Zofran 4 Mg/2 Ml)  4 mg IVP Q6H PRN


   PRN Reason: Nausea / Vomiting


Pantoprazole Sodium (Protonix)  40 mg PO 0700 [facility]


Prednisone (Deltasone)  5 mg PO AM [facility]


Prednisone (Deltasone)  2.5 mg PO PM [facility]


Simvastatin (Zocor)  40 mg PO HS [facility]


Tramadol HCl (Ultram)  50 mg PO Q4 PRN


   PRN Reason: PAIN














Exam





- Exam


Vital Signs: 


 Vital Signs (72 hours)











  02/25/17 02/25/17





  14:20 14:30


 


Temperature 96.8 F L 


 


Pulse Rate [ 88 72





Pulse ox]  


 


Respiratory 16 18





Rate  


 


Blood Pressure 120/58 101/68





[Left Arm]  


 


O2 Sat by Pulse 92 98





Oximetry  














General: Alert, Oriented to Person, Oriented to Place, Oriented to Time, 

Cooperative, Moderate distress


HEENT: Atraumatic, PERRLA, Dentition Normal, Hearing Grossly Normal.  No: Mouth 

Mucous membr. moist/Pink (dry), Photophobia, Scleral Icterus, Abnormal Pupil, 

Decreased Hearing Acuity, Pharyngeal Erythema, Tonsillar Exudate, Tonsillar 

Swelling


Neck: No: Stridor, Rigidity, Normal Range of Motion, Lymphadenopathy


Carotids: 





WNL





Thyroid: 





WNL





Lungs: Clear to auscultation, Normal air movement, Speaks full Sentences.  No: 

Respiratory Distress, Wheezes, Rales, Rhonchi, Prolonged Expiration


Cardiovascular: Regular rate, Normal S1, Normal S2, No murmurs.  No: Gallops, 

Rubs, Irregularly Irregular (PACs)


Murmur: Systolic Murmur


Abdomen: Normal bowel sounds, Soft, No hepatospenomegaly, No masses, Other (no 

distention noted, mild diffuse tenderness noted).  No: Rigid, Umbilical Hernia


Integumentary: Normal, Pink, Warm, Dry


Extremities: No clubbing, No cyanosis, No edema, Normal pulses


Neurological: Normal gait, Normal speech, Strength Equal Bilat, Normal tone, 

Sensation intact, Cranial nerves 3-12 NL, Reflexes 2+


Psych/Mental Status: Mental status NL, Mood NL, Appropriate Affect, Intact 

Judgment





Assessment/Plan





- Assessment/Plan


(1) Dehydration


Status: Acute   Current Visit: No   


Assessment: 


Will start IV NS, monitor for signs of CHF/fluid overload. Monitor creatinine 

and BUN.








(2) Diabetes mellitus type 2


Status: Chronic   Current Visit: Yes   


Assessment: 


Monitor Blood sugars, 








(3) Urinary tract infection


Status: Acute   Current Visit: No   


Qualifiers: 


   Urinary tract infection type: acute cystitis   Hematuria presence: without 

hematuria   Qualified Code(s): N30.00 - Acute cystitis without hematuria   


Assessment: 


Patient has not been able to keep po antibiotic down, will give IV at this 

time.  








VTE Assessment





- RISK FACTOR SCORE


VTE RISK FACTOR SCORES: AGE OVER 60 YEARS, OBESITY, ANTICIPATED BED CONFINEMENT 

OR IMMOBILIZATION > 24 HOURS





- RISK


VTE MODERATE RISK: SCORE OF 2 (RISK PROXIMAL DVT 2-4%) PROPHYAXIS NEEDED

## 2017-02-25 NOTE — DIAGNOSTIC IMAGING REPORT
RAPHAEL VILLANUEVA~

 [facility]

 [address] [facility] [address] [place] [address]. [phone]

~

Report Submission Date: 2017 1:55:20 PM CST







Patient ~ Study  

 

Name: EVELIA JAIMES ~ Date: 2017 1:11:21 PM CST

 

MRN:  ~ Modality Type: CR

 

Gender: F ~ Description: ABDOMEN

 

: 36 ~ Institution: [facility]

 

Physician: RAPHAEL VILLANUEVA

  ~ ~ ~





~

Abdomen multiple views 

Clinical history: Nausea and vomiting for 2 days 



Fecal retention and constipation is noted. No dilated small and large bowel 
loops. No gastric dilatation. Right hip prostheses. 



Impression: 

Constipation with fecal retention 

No dilated small bowel loops 

No free air

~

Electronically signed on 2017 1:55:20 PM CST by:

Raphael APARICIO

## 2017-02-26 NOTE — INPATIENT PROGRESS NOTE
Subjective





- Required Recertification Statement


I anticipate X number of days because-include discharge plan: 1 day





- Review of Systems


Events since last encounter: 





Patient states that she is feeling better but still is nauseated. Has not had 

any further vomiting. Does not have any chest pain or pressure, no SOB or 

dyspnea noted. No abdominal pain, is passing gas OK. Nausea seems worse after 

eating. 


General: Denies: Chills, Night Sweats


HEENT: Denies: Head Aches, Visual Changes


Cardiovascular: Denies: Palpitations, Orthopnea


Gastrointestinal: Nausea.  Denies: Vomiting, Abdominal Pain, Diarrhea, 

Constipation





Objective





- Exam


Vitals and I&O: 


 Vital Signs











Temp  98.7 F   02/26/17 13:08


 


Pulse  77   02/26/17 18:00


 


Resp  18   02/26/17 18:00


 


BP  98/49   02/26/17 18:00


 


Pulse Ox  97   02/26/17 18:00











 Intake & Output











 02/25/17 02/26/17 02/26/17





 23:59 11:59 23:59


 


Intake Total 6068 4620 6220


 


Balance 2548 4620 6220


 


Weight 83.461 kg  


 


Intake:   


 


  IV 1950 4500 6000


 


    Right Antecubital 1950 4500 6000


 


  Oral 598 120 220


 


Other:   


 


  Voiding Method Toilet Toilet 


 


  # Voids  2 


 


  # Bowel Movements   0














General: Alert, Oriented to Person, Oriented to Place, Oriented to Time, 

Cooperative


Neck: Supple, No JVD, No thyromegaly


Lungs: Clear to auscultation, Normal air movement, Speaks full Sentences, 

Respiratory Distress.  No: Wheezes, Rales, Rhonchi


Cardiovascular: Regular rate, Normal S1, Normal S2, No murmurs, Gallops


Abdomen: Normal bowel sounds, Soft.  No: No tenderness (mild diffuse tenderness 

noted)


Extremities: No clubbing, No cyanosis, No edema


Skin: Normal, Pink, Warm, Dry


Neurological: Normal gait


Psych/Mental Status: Mental status NL, Mood NL, Appropriate Affect





- Results


Results: 


 Laboratory Results











WBC  4.70 K/ul (4.00-12.00)   02/26/17  05:55    


 


RBC  3.10 M/ul (3.90-5.20)  L  02/26/17  05:55    


 


Hgb  8.6 g/dL (12.0-16.0)  L  02/26/17  05:55    


 


Hct  28.1 % (34.5-46.5)  L  02/26/17  05:55    


 


MCV  90.8 fl (80.0-100.0)   02/26/17  05:55    


 


MCH  27.9 pg (28.0-34.0)  L  02/26/17  05:55    


 


MCHC  30.7 g/dL (30.0-36.0)   02/26/17  05:55    


 


RDW  14.2 % (11.3-14.3)   02/26/17  05:55    


 


Plt Count  148 K/mm3 (130-400)   02/26/17  05:55    


 


Neut % (Auto)  76.5 % (39.0-79.0)   02/26/17  05:55    


 


Lymph % (Auto)  18.3 % (16.0-50.0)   02/26/17  05:55    


 


Mono % (Auto)  3.1 % (0.0-11.0)   02/26/17  05:55    


 


Eos % (Auto)  0.9 % (0.0-6.8)   02/26/17  05:55    


 


Baso % (Auto)  0.1  (0.0-1.5)   02/26/17  05:55    


 


Neut #  3.6 # k/uL (1.4-7.7)   02/26/17  05:55    


 


Lymph #  0.9 # k/uL (0.6-4.0)   02/26/17  05:55    


 


Mono #  0.2 # k/uL (0.0-0.9)   02/26/17  05:55    


 


Eos #  0.0 # k/uL (0.0-0.6)   02/26/17  05:55    


 


Baso #  0.0 # k/uL (0.0-0.5)   02/26/17  05:55    


 


Reactive Lymphs %  1.1 % (0.0-5.0)   02/26/17  05:55    


 


Reactive Lymphs #  0.0 # k/uL (0.0-0.8)   02/26/17  05:55    


 


Sodium  139 mmol/L (136-145)   02/26/17  05:55    


 


Potassium  5.0 mmol/L (3.5-5.0)   02/26/17  05:55    


 


Chloride  99 mmol/L ()   02/26/17  05:55    


 


Carbon Dioxide  33 mmol/L (20-32)  H  02/26/17  05:55    


 


BUN  73 mg/dL (10-26)  H  02/26/17  05:55    


 


Creatinine  3.0 mg/dL (0.4-1.5)  H  02/26/17  05:55    


 


Estimated Creat Clear  23   02/26/17  05:55    


 


Est GFR ( Amer)  19  (60-) L  02/26/17  05:55    


 


Est GFR (Non-Af Amer)  16  (60-) L  02/26/17  05:55    


 


Glucose  64 mg/dL (70-99)  L  02/26/17  05:55    


 


Calcium  8.4 mg/dL (8.5-10.5)  L  02/26/17  05:55    


 


Total Bilirubin  0.2 mg/dL (0.2-1.2)   02/26/17  05:55    


 


AST  25 U/L (0-41)   02/26/17  05:55    


 


ALT  16 U/L (0-45)   02/26/17  05:55    


 


Alkaline Phosphatase  35 U/L ()  L  02/26/17  05:55    


 


Total Protein  6.9 g/dL (6.0-8.5)   02/26/17  05:55    


 


Albumin  3.7 g/dL (3.0-5.5)   02/26/17  05:55    


 


Amylase  62 U/L ()   02/25/17  13:33    

















Assessment/Plan





- Assessment/Plan


(1) Dehydration


Status: Acute   Current Visit: No   


Assessment: 


BUN  69 to 73, creatinine 3.4 to 3.0


Will increase IV rate, recheck labs in AM. Watch for CHF symptoms.








(2) Diabetes mellitus type 2


Status: Chronic   Current Visit: Yes   


Assessment: 


Patient has been having hypoglycemic episodes. Will decrease metformin and stop 

glimiperide.








(3) Urinary tract infection


Status: Acute   Current Visit: No   


Qualifiers: 


   Urinary tract infection type: acute cystitis   Hematuria presence: without 

hematuria   Qualified Code(s): N30.00 - Acute cystitis without hematuria   


Assessment: 


IV antibiotics will be continued.








(4) Anemia


Status: Acute   Current Visit: Yes   


Qualifiers: 


   Anemia type: unspecified type   Qualified Code(s): D64.9 - Anemia, 

unspecified   


Assessment: 


Hgb has dropped from 10.2 to 8.6. Will get stool occult blood tests. Recheck 

Hgb.

## 2017-02-27 NOTE — DISCHARGE SUMMARY
Discharge Summary





- Discharge Sumary


History of Present Illness: 





Patient is a 77yo white female with a 3 day history of nausea associated with 

some vomiting over the last three days. Patient states that she has vomited 4 

times today. Nausea is made worse with taking Levaquin and eating. Will vomit 

within minutes post eating or taking medication.  Patient states that she has 

had some low-grade fever and chills.  Patient denies any hematemesis or melena.

  The patient recently was diagnosed with urinary tract infection and was 

started on Levaquin 500 mg by mouth daily.  Patient states that she feels that 

she is getting dehydrated.  Patient has been having some mild orthostatic 

symptoms.  Patient subsequently came to the ED for evaluation.


Condition at Discharge: Stable


Home Medications: 


 Ambulatory Orders











 Medication  Instructions  Recorded


 


Carvedilol [Coreg] 6.25 mg PO BS 02/05/17


 


Citalopram Hydrobromide 20 mg PO DAILY 02/05/17





[Citalopram HBr]  


 


Clopidogrel Bisulfate [Clopidogrel] 75 mg PO DAILY 02/05/17


 


Lisinopril 5 mg PO HS 02/05/17


 


Lorazepam 0.5 mg PO HS PRN 02/05/17


 


Tramadol HCl [Ultram] 50 - 100 mg PO Q4 PRN 02/05/17


 


Aspirin [Heraclio] 81 mg PO DAILY 02/13/17


 


Dexlansoprazole [Dexilant] 60 mg PO DAILY 02/13/17


 


Docusate Sodium [Colace] 100 - 200 mg PO DAILY 02/13/17


 


Gabapentin [Neurontin] 300 mg PO 09 02/13/17


 


Gabapentin [Neurontin] 600 mg PO HS 02/13/17


 


[place]-3S/Dha/Epa/Fish Oil [Fish 1 each PO D 02/13/17





Oil 1,200 mg Softgel]  


 


Pravastatin Sodium [Pravachol] 80 mg PO HS 02/13/17


 


Prenatal Vit/Iron Fumarate/FA 1 each PO D 02/13/17





[Prenatal Tablet]  


 


Ergocalciferol (Vitamin D2) 400 unit PO D 02/15/17





[Vitamin D]  


 


Ondansetron HCl Rapdis [Zofran ODT] 4 mg PO Q8 PRN 02/25/17


 


Prednisone 5 mg PO AM 02/25/17


 


predniSONE [Deltasone] 2.5 mg PO PM 02/25/17


 


Furosemide [Lasix] 20 mg PO D #0  02/27/17











Consultations this Visit: None


Procedures this Visit: None


Allergies/Adverse Reactions: 


 Allergies











Allergy/AdvReac Type Severity Reaction Status Date / Time


 


Sulfa (Sulfonamide Allergy Unknown Generalized Verified 02/25/17 13:10





Antibiotics)   Swelling  











Discharge Summary: 





Patient was felt to be significantly dehydrated.  On admission creatinine was 

3.1 with a BUN of 69.  Patient was started on IV fluids of normal saline and 

rehydrated.  At the time of dismissal creatinine had improved to 1.9 and BUN 

had improved to 57.  Patient was noted to be anemic with a hemoglobin of 9.0.  

This remained stable during the hospitalization.  Patient was continued on 

levofloxacin for her urinary tract infection.  Patient diabetes mellitus 

remained stable, patient did have some hypoglycemic episodes and medications 

were adjusted.  At the time of dismissal patient was stable and was felt that 

she could be followed up on an outpatient basis.  Patient was discharged in 

stable condition.





- Final Diagnosis


(1) Dehydration


Problems: 


improved with IV fluids, encouraged to maintain good oral intake at home.








(2) Diabetes mellitus type 2


Problems: 


Patient was advised to check her blood sugars closely states that she did have 

some hypoglycemic episodes in the hospital.








(3) Urinary tract infection


Problems: 


Continue with antibiotic therapy.








(4) Anemia


Problems: 


Will monitor patient's anemia.

## 2017-02-27 NOTE — DIAGNOSTIC IMAGING REPORT
[facility] 

[facility]

[address] [facility] [address] [place] [address]. [address]

 

 

 

 

Report Submission Date: 2017 1:47:36 PM CST

Patient       Study

Name:   EVELIA JAIMES       Date:   2017 8:04:51 AM CST

MRN:          Modality Type:   US

Gender:   F       Description:   US ABD LIMITED

:   36       Institution:   [facility]

Physician:   [facility]

         

 

 

Limited abdominal sonogram. 



History: Abdominal pain and vomiting. 



Findings: 



There is diffuse increased echogenicity of the liver consistent with diffuse 
hepatic steatosis. The gallbladder not well distended however there is diffuse 
gallbladder wall thickening. There sludge with probable small stones within the 
gallbladder lumen. 



The common bile duct is normal caliber measuring 4.4 mm. The visible portion of 
pancreas is normal. The right kidney is free of hydronephrosis. The proximal 
IVC is normal. 



Impression: 



1. Mild diffuse gallbladder wall thickening with gallbladder sludge and stones 
present. Clinical correlation recommended. 



2. No evidence of biliary dilation

 

Electronically signed on 2017 1:47:36 PM CST by:

Jackson APARICIO

## 2017-03-07 NOTE — DIAGNOSTIC IMAGING REPORT
KENNA VILLANUEVA 

[facility]

[address] [facility] [address] [address]

 

 

 

 

Report Submission Date: Mar 7, 2017 3:24:32 PM CST

Patient       Study

Name:   EVELIA JAIMES       Date:   Mar 7, 2017 1:07:13 PM CST

MRN:          Modality Type:   CR

Gender:   F       Description:   LOWER EXTREMITY

:   36       Institution:   [facility]

Physician:   KENNA VILLANUEVA

         

 

 

Left femur AP and lateral 



Clinical history pain 



Technique AP and lateral left femur 



Findings: There is an intact left knee arthroplasty. Femoral artery 
calcification is present. No fracture lytic changes are seen in the femur. Bone 
density appears normal. Arthritis is present at the hip 



Impression: Degenerative arthritis of the hip 



Intact femur 



Intact left knee arthroplasty

 

Electronically signed on Mar 7, 2017 3:24:32 PM CST by:

Wero APARICIO

## 2017-04-04 NOTE — DIAGNOSTIC IMAGING REPORT
[facility]

[address] [facility] [address] [address]

 

 

 

 

Report Submission Date: 2017 3:23:26 PM CDT

Patient       Study

Name:   EVELIA JAIMES       Date:   2017 3:38:44 PM CDT

MRN:          Modality Type:   US

Gender:   F       Description:   UNILAT LTD STDY EXT VEINS

:   36       Institution:   [facility]

Physician:   SMALL, RUTH

         

 

 

Doppler ultrasound 



History: SWELLING RIGHT LOWER LEG X 2 DAYS 

Multiple grayscale and color Doppler images of the right lower extremity are 
submitted 



Findings: No comparison studies 



Flow is demonstrated in the right external iliac, common femoral, deep femoral 
vein, mid femoral vein in the proximal, mid and distal aspect. The right 
popliteal vein compressible and demonstrates flow augmentation. The right 
posterior tibial vein compressible and demonstrates flow 

The right greater saphenous vein demonstrates echogenicity within its lumen 
suggestive of thrombus 

Varicose veins are noted in the calf 



Impression: 



No evidence of deep venous thrombosis 

Thrombus is visualized in the greater saphenous vein in the mid thigh, and in 
the varicose veins in the calf.

 

Electronically signed on 2017 3:23:26 PM CDT by:

Shelly Ponce

Findings discussed by Dr. Ponce with ALYSHA Sharp on 17 at approx. 3:32 pm 
CST

Addendum electronically signed by Shelly Ponce on 2017 3:32:34 
PM CDT
MTDD

## 2017-05-08 NOTE — DIAGNOSTIC IMAGING REPORT
YAIR NAQVI (NP) - ER 

[facility]

[address] [facility] [address] [address]

 

 

 

 

Report Submission Date: May 8, 2017 7:21:40 AM CDT

Patient       Study

Name:   EVELIA JAIMES       Date:   May 8, 2017 7:10:28 AM CDT

MRN:          Modality Type:   CR

Gender:   F       Description:   CHEST

:   36       Institution:   [facility]

Physician:   YAIR NAQVI (KVNG) - ER

         

 

 

Single frontal view of the chest 



History: DYSPNEA. PT STATES TINGLING IN NECK AND HANDS SINCE 17 

Findings: Comparison: None available 



Cardiomegaly. Low lung volumes. Mild pulmonary vascular congestion is present. 

There is bibasilar atelectasis/infiltrate. No pleural effusion 

Right humeral prosthesis is noted. Degenerative changes of the left shoulder 



Impression: 



Cardiomegaly with mild pulmonary vascular congestion 

Bibasilar infiltrate/atelectasis. Low lung volumes limit the study.

 

Electronically signed on May 8, 2017 7:21:40 AM CDT by:

Shelly APARICIO

## 2017-05-08 NOTE — ED PHYSICIAN DOCUMENTATION
General Adult





- HISTORIAN


Historian: patient





- HPI


Stated Complaint: right leg, right hand swelling, pain


Chief Complaint: General Adult


Onset: days ago


Timing: worse


Further Comments: yes (77 year old female patient presents with worsening right 

hand and right leg edema and pain;  Patient was seen on 5/4/17 by YI Peter and 

prescribed prednisone burst.  Patient did not understand to  and start 

additional prednisone.  Currently on 5mg qam and 2.5mg qpm for Sjogren's.  

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 

developed DVT right saphenous vein and started eliquis 4/5/17 per Dr Coley.  

Family reports increased fatigue and weakness over the weekend.)





- ROS


CONST: weakness


EYES/ENT: none


CVS/RESP: none


GI/: none


MS/SKIN/LYMPH: leg swelling (right), leg pain (right), ankle swelling (right).  

denies: calf pain, neck pain, joint pain


NEURO/PSYCH: difficulty walking.  denies: headache, fainting, dizziness, 

tingling, numbness, difficulty with speech, anxiety





- PAST HX


Past History: AMI, hypertension


Other History: diabetes Type 2, other (HLD, Aortic stenosis, CHF, NIDDM, Sjogren

's, depression)


Allergies/Adverse Reactions: 


 Allergies











Allergy/AdvReac Type Severity Reaction Status Date / Time


 


Sulfa (Sulfonamide Allergy Unknown Generalized Verified 05/08/17 07:08





Antibiotics)   Swelling  














Home Medications: 


 Ambulatory Orders











 Medication  Instructions  Recorded


 


Carvedilol [Coreg] 6.25 mg PO Q12 02/05/17


 


Citalopram Hydrobromide 20 mg PO DAILY 02/05/17





[Citalopram HBr]  


 


Clopidogrel Bisulfate [Clopidogrel] 75 mg PO DAILY 02/05/17


 


Lisinopril 5 mg PO HS 02/05/17


 


Lorazepam 0.5 mg PO HS PRN 02/05/17


 


Tramadol HCl [Ultram] 50 - 100 mg PO Q4 PRN 02/05/17


 


Dexlansoprazole [Dexilant] 60 mg PO DAILY 02/13/17


 


Docusate Sodium [Colace] 100 - 300 mg PO DAILY 02/13/17


 


Gabapentin [Neurontin] 300 mg PO 09 02/13/17


 


Gabapentin [Neurontin] 600 mg PO HS 02/13/17


 


[place]-3S/Dha/Epa/Fish Oil [Fish 1 each PO D 02/13/17





Oil 1,200 mg Softgel]  


 


Pravastatin Sodium [Pravachol] 80 mg PO HS 02/13/17


 


Prenatal Vit/Iron Fumarate/FA 1 each PO D 02/13/17





[Prenatal Tablet]  


 


Ergocalciferol (Vitamin D2) 400 unit PO D 02/15/17





[Vitamin D]  


 


Ondansetron HCl Rapdis [Zofran ODT] 4 mg PO Q8 PRN 02/25/17


 


Prednisone 5 mg PO AM 02/25/17


 


predniSONE [Deltasone] 2.5 mg PO PM 02/25/17


 


Furosemide [Lasix] 20 mg PO BID 05/08/17


 


Glimepiride [Glimepiride] 1 mg PO QDAY 05/08/17


 


Metformin HCl [Glucophage XR] 1,000 mg PO BID 05/08/17














- SOCIAL HX


Smoking History: non-smoker





- FAMILY HX


Family History: No





- VITAL SIGNS


Vital Signs: 





 Vital Signs











Temp Pulse Resp BP Pulse Ox


 


 98.7 F   85   18   134/61   98 


 


 05/08/17 07:10  05/08/17 09:00  05/08/17 07:10  05/08/17 07:10  05/08/17 09:00














- REVIEWED ASSESSMENTS


Nursing Assessment  Reviewed: Yes


Vitals Reviewed: Yes





Progress





- Progress


Progress: 


0930 Discusssed lab findings with patient and daughter.  Patient is a fall risk

, daughter concerned she cannot take care of patient at home. Daughter reports 

patient was up cleaning house 2 weeks ago, increased weakness and difficultly 

walking since DVT and right foot edema started last week.  Family prefers 

admission at [facility], offered transfer or admission. 





0940 Case discussed with Dr Nieto, will admit tele-observation; Dr Nieto to 

management medical treatment





ED Results Lab/Radiology





- Lab Results


Lab Results: 





 Lab Results











  05/08/17 05/08/17 05/08/17





  07:10 07:10 07:10


 


WBC      





    


 


RBC      





    


 


Hgb      





    


 


Hct      





    


 


MCV      





    


 


MCH      





    


 


MCHC      





    


 


RDW      





    


 


Plt Count      





    


 


Neut % (Auto)      





    


 


Lymph % (Auto)      





    


 


Mono % (Auto)      





    


 


Eos % (Auto)      





    


 


Baso % (Auto)      





    


 


Neut #      





    


 


Lymph #      





    


 


Mono #      





    


 


Eos #      





    


 


Baso #      





    


 


Reactive Lymphs %      





    


 


Reactive Lymphs #      





    


 


PT  12.6 Seconds H Seconds    





   (9.7-11.5)   


 


INR  1.2  H     





   (0.9-1.1)   


 


APTT  36.4 Seconds H Seconds    





   (24.5-32.8)   


 


Sodium      134 mmol/L L mmol/L





     (136-145) 


 


Potassium      4.5 mmol/L mmol/L





     (3.5-5.0) 


 


Chloride      101 mmol/L mmol/L





     () 


 


Carbon Dioxide      33 mmol/L H mmol/L





     (20-32) 


 


BUN      44 mg/dL H mg/dL





     (10-26) 


 


Creatinine      1.2 mg/dL mg/dL





     (0.4-1.5) 


 


Estimated Creat Clear      54 





    


 


Est GFR ( Amer)      > 60 





    (60 - )


 


Est GFR (Non-Af Amer)      > 60 





    (60 - )


 


Glucose      198 mg/dL H mg/dL





     (70-99) 


 


Uric Acid      10.1 mg/dL H mg/dL





     (2.0-7.8) 


 


Calcium      9.9 mg/dL mg/dL





     (8.5-10.5) 


 


Total Bilirubin      0.5 mg/dL mg/dL





     (0.2-1.2) 


 


AST      19 U/L U/L





     (0-41) 


 


ALT      16 U/L U/L





     (0-45) 


 


Alkaline Phosphatase      56 U/L U/L





     () 


 


Creatine Kinase      320 U/L H U/L





     (0-225) 


 


Troponin I    < 0.03 ng/mL L ng/mL  





    (0.03-0.06)  


 


NT-Pro-B Natriuret Pep    1917.1 pg/mL H pg/mL  





    (15.0-450.0)  


 


Total Protein      8.4 g/dL g/dL





     (6.0-8.5) 


 


Albumin      4.4 g/dL g/dL





     (3.0-5.5) 














  05/08/17





  07:10


 


WBC  6.64 K/ul K/ul





   (4.00-12.00) 


 


RBC  3.21 M/ul L M/ul





   (3.90-5.20) 


 


Hgb  9.1 g/dL L g/dL





   (12.0-16.0) 


 


Hct  28.5 % L %





   (34.5-46.5) 


 


MCV  88.9 fl fl





   (80.0-100.0) 


 


MCH  28.4 pg pg





   (28.0-34.0) 


 


MCHC  32.0 g/dL g/dL





   (30.0-36.0) 


 


RDW  14.9 % H %





   (11.3-14.3) 


 


Plt Count  213 K/mm3 K/mm3





   (130-400) 


 


Neut % (Auto)  74.8 % %





   (39.0-79.0) 


 


Lymph % (Auto)  20.0 % %





   (16.0-50.0) 


 


Mono % (Auto)  3.4 % %





   (0.0-11.0) 


 


Eos % (Auto)  0.6 % %





   (0.0-6.8) 


 


Baso % (Auto)  0.2 





   (0.0-1.5) 


 


Neut #  5.0 # k/uL # k/uL





   (1.4-7.7) 


 


Lymph #  1.3 # k/uL # k/uL





   (0.6-4.0) 


 


Mono #  0.2 # k/uL # k/uL





   (0.0-0.9) 


 


Eos #  0.0 # k/uL # k/uL





   (0.0-0.6) 


 


Baso #  0.0 # k/uL # k/uL





   (0.0-0.5) 


 


Reactive Lymphs %  1.0 % %





   (0.0-5.0) 


 


Reactive Lymphs #  0.1 # k/uL # k/uL





   (0.0-0.8) 


 


PT  





  


 


INR  





  


 


APTT  





  


 


Sodium  





  


 


Potassium  





  


 


Chloride  





  


 


Carbon Dioxide  





  


 


BUN  





  


 


Creatinine  





  


 


Estimated Creat Clear  





  


 


Est GFR ( Amer)  





  


 


Est GFR (Non-Af Amer)  





  


 


Glucose  





  


 


Uric Acid  





  


 


Calcium  





  


 


Total Bilirubin  





  


 


AST  





  


 


ALT  





  


 


Alkaline Phosphatase  





  


 


Creatine Kinase  





  


 


Troponin I  





  


 


NT-Pro-B Natriuret Pep  





  


 


Total Protein  





  


 


Albumin  





  














- Orders


Orders: 





 ED Orders











 Category Date Time Status


 


 Continuous EKG monitoring Q30M Care  05/08/17 07:06 Active


 


 Continuous Pulse Oximetry Q30M Care  05/08/17 07:06 Active


 


 Place Saline Lock/IV NOW Care  05/08/17 07:06 Active


 


 CHEST 1 VIEW [RAD] Stat Exams  05/08/17 07:06 Taken


 


 BNP [NT-proBNP] Stat Lab  05/08/17 07:10 Completed


 


 CBC/PLATELET/DIFF Stat Lab  05/08/17 07:10 Completed


 


 CMP Stat Lab  05/08/17 07:10 Completed


 


 CREATINE KINASE Stat Lab  05/08/17 07:10 Completed


 


 PT-INR Stat Lab  05/08/17 07:10 Completed


 


 PTT Stat Lab  05/08/17 07:10 Completed


 


 TROPONIN I (cTnI) Stat Lab  05/08/17 07:10 Completed


 


 UA W/MICRO IF INDICATED Stat Lab  05/08/17 07:06 Ordered


 


 URIC ACID Stat Lab  05/08/17 07:10 Completed


 


 Oxygen Daily Oxygen  05/08/17 07:15 Ordered


 


 EKG WITH COMPARISON Stat Ther  05/08/17 07:06 Completed














General Adult Physical Exam





- PHYSICAL EXAM


GENERAL APPEARANCE: moderate distress


EENT: eye inspection normal, EDMUNDO


RESPIRATORY: no resp distress, chest non-tender, other (fine crackles in bases; 

RA Sat 88%)


CVS: reg rate & rhythm, heart sounds normal, equal pulses, no murmur, no gallop

, PMI nml, no JVD, no friction rub, 24


ABDOMEN: soft, no organomegaly, normal bowel sounds, no abdominal bruit, no 

distension


BACK: normal inspection, no CVA tenderness


SKIN: warm/dry, normal color, other (multiple areas of ecchymosis on bilateral 

forearms)


EXTREMITIES: non-tender, normal range of motion, no evidence of injury, edema (

right leg 2+; left ankle 1+; right hand edema)


NEURO: oriented X3, CN's nml as tested, motor nml, sensation nml, mood/affect 

nml





Discharge


Clincal Impression: 


 Unsteady gait, Hypoxemia requiring supplemental oxygen





Sjogren's syndrome


Qualifiers:


 Sjogren's organ involvement: unspecified organ involvement Qualified Code(s): 

M35.00 - Sicca syndrome, unspecified





Gout attack


Qualifiers:


 Gout site: hand Gout etiology: unspecified cause Laterality: right Qualified 

Code(s): M10.9 - Gout, unspecified





CHF (congestive heart failure)


Qualifiers:


 Congestive heart failure type: combined Congestive heart failure chronicity: 

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 

(congestive) and diastolic (congestive) heart failure





Home Medications: 


Ambulatory Orders





Carvedilol [Coreg] 6.25 mg PO Q12 02/05/17 


Citalopram Hydrobromide [Citalopram HBr] 20 mg PO DAILY 02/05/17 


Clopidogrel Bisulfate [Clopidogrel] 75 mg PO DAILY 02/05/17 


Lisinopril 5 mg PO HS 02/05/17 


Lorazepam 0.5 mg PO HS PRN 02/05/17 


Tramadol HCl [Ultram] 50 - 100 mg PO Q4 PRN 02/05/17 


Dexlansoprazole [Dexilant] 60 mg PO DAILY 02/13/17 


Docusate Sodium [Colace] 100 - 300 mg PO DAILY 02/13/17 


Gabapentin [Neurontin] 300 mg PO 09 02/13/17 


Gabapentin [Neurontin] 600 mg PO HS 02/13/17 


[place]-3S/Dha/Epa/Fish Oil [Fish Oil 1,200 mg Softgel] 1 each PO D 02/13/17 


Pravastatin Sodium [Pravachol] 80 mg PO HS 02/13/17 


Prenatal Vit/Iron Fumarate/FA [Prenatal Tablet] 1 each PO D 02/13/17 


Ergocalciferol (Vitamin D2) [Vitamin D] 400 unit PO D 02/15/17 


Ondansetron HCl Rapdis [Zofran ODT] 4 mg PO Q8 PRN 02/25/17 


Prednisone 5 mg PO AM 02/25/17 


predniSONE [Deltasone] 2.5 mg PO PM 02/25/17 


Furosemide [Lasix] 20 mg PO BID 05/08/17 


Glimepiride [Glimepiride] 1 mg PO QDAY 05/08/17 


Metformin HCl [Glucophage XR] 1,000 mg PO BID 05/08/17 








Condition: Fair


Disposition: 09 ADMITTED AS INPATIENT


Decision to Admit: NO


Decision Time: 10:00

## 2017-05-08 NOTE — HISTORY AND PHYSICAL REPORT
History of Present Illnes





- History of Present Illness


Reason for Visit: weakness


History of Present Illness: 





77-year-old white female who stated she is been feeling weak for several days. 

Patient feels like she is having some gout problems. Patient is having a lot of 

stiffness and pain in her feet and ankles and knees. It was felt that the 

patient was not it ambulating well at home and that she was becoming an 

increased fall risk related to her discomfort. Patient is taking allopurinol. 

Not able to take colchicine. Patient was subsequently admitted to the hospital 

for further care and evaluation.





- Past Medical History


Cardiac: HTN, Hyperlipidemia, Aortic stenosis (s/p valve repalcement).  denies: 

CAD, CHF


Gastrointestinal: GERD


Heme/Onc: Other (DVT after hip surgery 9/14, recurrent DVT 2 months ago -on 

anticoagulation therapy)


Psych: Anxiety, Depression


Musculoskeletal: Osteoarthritis, Other (Sojogren's syndrome, s/p DVT)


Rheumatologic: Other (Sjogren syndrom, diverticulosis, )


Renal/: Other (kidney stones)


Endocrine: Diabetes, Other (diabetic neuropathy)





- Past Surgical History


Past Surgical History: Appendectomy, Cholecystectomy, Cataract Removal, Total 

Knee Replacement (bilaterally), Other (shoulder surgery, aortic valve 

replacement, lower back surgery, carpal tunnel release, AP bladder repair.)





- Past Social History


Smoke: No


Occupation: retired, manager of gas station


Alcohol: None


Drugs: None


Lives: With Family ([facility])





- Health Maintenance


Health Maintenance: Tetanus (2011), Pneumococcal Vaccine (2014), Mammogram (Aug 

11), DEXA


Pneumonia Vaccine: Yes


Resuscitation Status: 


Resusciation Status





Resuscitation Status             Full Code














- Unable to Obtain History


Unable to Obtain: No





Review of Systems





- Review of Systems


Constitutional: negative: Fever, Chills, Sweats


Eyes: negative: pain, vision change


ENT: negative: Ear Pain, Ear Discharge, Nose Pain, Nose Discharge, Nose 

Congestion, Mouth Swelling


Respiratory: negative: Cough, Dry, Shortness of Breath, Hemoptysis, SOB with 

Excertion, Pleuritic Pain, Sputum, Wheezing


Cardiovascular: negative: Chest Pain, Palpitations, Orthopnea


Gastrointestinal: negative: Nausea, Vomiting, Abdominal Pain


Genitourinary: negative: Dysuria, Frequency, Incontinence


Musculoskeletal: Back Pain, Leg Pain.  negative: Neck Pain, Shoulder Pain


Skin: negative: Rash


Neurological: Weakness (generaslized).  negative: Seizures





- Medications/Allergies


Allergies/Adverse Reactions: 


 Allergies











Allergy/AdvReac Type Severity Reaction Status Date / Time


 


Sulfa (Sulfonamide Allergy Unknown Generalized Verified 05/08/17 07:08





Antibiotics)   Swelling  














Home Medications: 


 Home Medications





Furosemide [Lasix] 20 mg PO BID 05/08/17 











Current Inpatient Medications: 


 Current Inpatient Medications





Carvedilol (Coreg)  6.25 mg PO Q12 [facility]


Citalopram Hydrobromide (Celexa)  20 mg PO DAILY [facility]


Clopidogrel Bisulfate (Plavix)  75 mg PO DAILY [facility]


   Last Admin: 05/08/17 12:47 Dose:  75 mg


Docusate Sodium (Colace)  100 mg PO DAILY [facility]


Furosemide (Lasix)  20 mg PO BID [facility]


   Last Admin: 05/08/17 12:47 Dose:  20 mg


Gabapentin (Neurontin)  300 mg PO 09 [facility]


Gabapentin (Neurontin)  600 mg PO HS [facility]


Glimepiride (Amaryl)  1 mg PO 0730 [facility]


Lisinopril (Prinivil)  5 mg PO HS [facility]


Lorazepam (Ativan)  0.5 mg PO HS PRN


   PRN Reason: Anxiety


Metformin HCl (Glucophage)  1,000 mg PO 43434 [facility]


Pantoprazole Sodium (Protonix)  40 mg PO 0700 [facility]


   Last Admin: 05/08/17 12:46 Dose:  40 mg


Prednisone (Deltasone)  5 mg PO AM [facility]


Prednisone (Deltasone)  2.5 mg PO PM [facility]














Exam





- Exam


Vital Signs: 


 Vital Signs (72 hours)











  05/08/17 05/08/17 05/08/17





  10:00 10:08 10:14


 


Temperature  98.7 F 


 


Pulse Rate 86  


 


Pulse Rate [   





Left]   


 


Pulse Rate [  86 





Pulse ox]   


 


Respiratory  18 





Rate   


 


Blood Pressure  123/53 





[Left Arm]   


 


O2 Sat by Pulse 98 98 86 L





Oximetry   














  05/08/17 05/08/17





  10:32 10:33


 


Temperature 98.4 F 98.4 F


 


Pulse Rate  


 


Pulse Rate [ 79 79





Left]  


 


Pulse Rate [  





Pulse ox]  


 


Respiratory 18 18





Rate  


 


Blood Pressure 144/66 144/66





[Left Arm]  


 


O2 Sat by Pulse 94 94





Oximetry  














General: Alert, Oriented to Person, Oriented to Place, Oriented to Time, 

Cooperative


HEENT: Atraumatic, Mouth Mucous membr. moist/Pink, Hearing Grossly Normal.  No: 

Pharyngeal Erythema


Neck: Normal Range of Motion.  No: Lymphadenopathy


Carotids: 





WNL


Thyroid: 





WNL


Lungs: Clear to auscultation, Normal air movement, Speaks full Sentences, 

Respiratory Distress.  No: Wheezes, Rales, Rhonchi


Cardiovascular: Regular rate, Normal S1, Normal S2, No murmurs.  No: Gallops, 

Murmur


Abdomen: Normal bowel sounds, Soft, No tenderness, No hepatospenomegaly, No 

masses.  No: Distended


Integumentary: Normal, Pink, Warm, Dry, Other (swelling to the thenar eminence 

of the right hand)


Extremities: No clubbing, No cyanosis, Normal pulses


Neurological: Normal speech, Strength Equal Bilat, Normal tone, Sensation intact


Psych/Mental Status: Mental status NL, Mood NL, Appropriate Affect, Intact 

Judgment





Assessment/Plan





- Assessment/Plan


(1) Gout attack


Status: Acute   


Qualifiers: 


   Gout site: hand   Gout etiology: unspecified cause   Laterality: right   

Qualified Code(s): M10.9 - Gout, unspecified   


Assessment: 


uric acid level is elevated at 10. Will start steroid therapy and allopurinol








(2) Sjogren's syndrome


Status: Chronic   


Qualifiers: 


   Sjogren's organ involvement: unspecified organ involvement   Qualified Code(s

): M35.00 - Sicca syndrome, unspecified   


Assessment: 


stable, on chronic steroid therapy








(3) CHF (congestive heart failure)


Status: Acute   


Qualifiers: 


   Congestive heart failure type: unspecified congestive heart failure type   

Congestive heart failure chronicity: chronic   Qualified Code(s): I50.9 - Heart 

failure, unspecified   





(4) Diabetes mellitus type 2


Status: Chronic   





VTE Assessment





- RISK FACTOR SCORE


VTE RISK FACTOR SCORES: AGE OVER 60 YEARS (on anticoagulation therapy), 

ANTICIPATED BED CONFINEMENT OR IMMOBILIZATION > 24 HOURS

## 2017-05-10 VITALS
SYSTOLIC BLOOD PRESSURE: 133 MMHG | DIASTOLIC BLOOD PRESSURE: 60 MMHG | SYSTOLIC BLOOD PRESSURE: 133 MMHG | SYSTOLIC BLOOD PRESSURE: 133 MMHG | DIASTOLIC BLOOD PRESSURE: 60 MMHG | DIASTOLIC BLOOD PRESSURE: 60 MMHG

## 2017-05-10 NOTE — DISCHARGE SUMMARY
Discharge Summary





- Discharge Sumary


History of Present Illness: 





77-year-old white female who stated she is been feeling weak for several days. 

Patient feels like she is having some gout problems. Patient is having a lot of 

stiffness and pain in her feet and ankles and knees. It was felt that the 

patient was not it ambulating well at home and that she was becoming an 

increased fall risk related to her discomfort. Patient is taking allopurinol. 

Not able to take colchicine. Patient was subsequently admitted to the hospital 

for further care and evaluation.


Condition at Discharge: Stable


Home Medications: 


 Ambulatory Orders











 Medication  Instructions  Recorded


 


Carvedilol [Coreg] 6.25 mg PO Q12 02/05/17


 


Citalopram Hydrobromide 20 mg PO DAILY 02/05/17





[Citalopram HBr]  


 


Clopidogrel Bisulfate [Clopidogrel] 75 mg PO DAILY 02/05/17


 


Tramadol HCl [Ultram] 50 - 100 mg PO Q4 PRN 02/05/17


 


Docusate Sodium [Colace] 100 - 300 mg PO DAILY 02/13/17


 


[place]-3S/Dha/Epa/Fish Oil [Fish 1 each PO D 02/13/17





Oil 1,200 mg Softgel]  


 


Pravastatin Sodium [Pravachol] 80 mg PO HS 02/13/17


 


Ergocalciferol (Vitamin D2) 400 unit PO D 02/15/17





[Vitamin D]  


 


Prednisone 5 mg PO AM 02/25/17


 


predniSONE [Deltasone] 2.5 mg PO PM 02/25/17


 


Furosemide [Lasix] 20 mg PO BID 05/08/17


 


Allopurinol [Zyloprim] 100 mg PO DAILY #30  05/10/17











Consultations this Visit: None


Procedures this Visit: None


Allergies/Adverse Reactions: 


 Allergies











Allergy/AdvReac Type Severity Reaction Status Date / Time


 


Sulfa (Sulfonamide Allergy Unknown Generalized Verified 05/08/17 07:08





Antibiotics)   Swelling  











Discharge Summary: 





Ankles and knees. It was felt to be gals related. Patient has been on 

allopurinol without any improvement. Patient is not able stay  to take 

colchicine. Patient was not ambulating at home related to the pain. Patient was 

brought into the hospital started on IV's steroids. This did calm down her 

guard symptoms. Patient blood sugar did increase some with the steroids. At the 

time of dismissal patient was felt to be having a lot of deconditioning issues 

also that was contributing to her gait disturbance. It was felt that she would 

benefit from some further rehab services and was subsequently transferred 

toS. 





- Final Diagnosis


(1) Gout attack


Problems: 


improved











(3) CHF (congestive heart failure)


Problems: 


stable on home meds








(4) Diabetes mellitus type 2


Problems: 


stable, patient did have some hyperglycemia with steroids

## 2017-05-10 NOTE — INPATIENT PROGRESS NOTE
Subjective





- Required Recertification Statement


I anticipate X number of days because-include discharge plan: 1 day





- Review of Systems


Subjective: 





Patient is still having a lot of pain related to gout, steroids do seem to be 

helping, swelling to joints are better. BS have been elevated some. 


General: Fatigue.  Denies: Chills


Cardiovascular: Denies: Chest Pain


Gastrointestinal: Denies: Nausea, Vomiting, Abdominal Pain





Objective





- Exam


Vitals and I&O: 


 Vital Signs











Temp  97.8 F   05/10/17 06:00


 


Pulse  77   05/10/17 06:00


 


Resp  16   05/10/17 02:00


 


BP  108/84   05/10/17 06:00


 


Pulse Ox  95   05/10/17 06:00











 Intake & Output











 05/09/17 05/09/17 05/10/17





 11:59 23:59 11:59


 


Intake Total 600 1040 100


 


Balance 600 1040 100


 


Weight 63.049 kg  


 


Intake:   


 


  IV   100


 


    Left Wrist   100


 


  Oral 600 1040 


 


Other:   


 


  Voiding Method Toilet Toilet 


 


  # Voids 2  2


 


  # Bowel Movements  0 














General: Alert, Oriented to Person, Oriented to Place, Oriented to Time


Neck: Supple, No JVD


Lungs: Clear to auscultation, Normal air movement, Speaks full Sentences.  No: 

Wheezes, Rales, Rhonchi


Cardiovascular: Regular rate, Normal S1, Normal S2, No murmurs, Gallops


Abdomen: Normal bowel sounds, Soft


Extremities: Other (swelling to the ight knee is imrpoved, swelling tot he 

right hand is imporve,  some)


Skin: Normal, Pink, Warm





- Results


Results: 


 Laboratory Results











WBC  6.64 K/ul (4.00-12.00)   05/08/17  07:10    


 


RBC  3.21 M/ul (3.90-5.20)  L  05/08/17  07:10    


 


Hgb  9.1 g/dL (12.0-16.0)  L  05/08/17  07:10    


 


Hct  28.5 % (34.5-46.5)  L  05/08/17  07:10    


 


MCV  88.9 fl (80.0-100.0)   05/08/17  07:10    


 


MCH  28.4 pg (28.0-34.0)   05/08/17  07:10    


 


MCHC  32.0 g/dL (30.0-36.0)   05/08/17  07:10    


 


RDW  14.9 % (11.3-14.3)  H  05/08/17  07:10    


 


Plt Count  213 K/mm3 (130-400)   05/08/17  07:10    


 


Neut % (Auto)  74.8 % (39.0-79.0)   05/08/17  07:10    


 


Lymph % (Auto)  20.0 % (16.0-50.0)   05/08/17  07:10    


 


Mono % (Auto)  3.4 % (0.0-11.0)   05/08/17  07:10    


 


Eos % (Auto)  0.6 % (0.0-6.8)   05/08/17  07:10    


 


Baso % (Auto)  0.2  (0.0-1.5)   05/08/17  07:10    


 


Neut #  5.0 # k/uL (1.4-7.7)   05/08/17  07:10    


 


Lymph #  1.3 # k/uL (0.6-4.0)   05/08/17  07:10    


 


Mono #  0.2 # k/uL (0.0-0.9)   05/08/17  07:10    


 


Eos #  0.0 # k/uL (0.0-0.6)   05/08/17  07:10    


 


Baso #  0.0 # k/uL (0.0-0.5)   05/08/17  07:10    


 


Reactive Lymphs %  1.0 % (0.0-5.0)   05/08/17  07:10    


 


Reactive Lymphs #  0.1 # k/uL (0.0-0.8)   05/08/17  07:10    


 


ESR  85 mm/hr (0-30)  H  05/08/17  07:10    


 


PT  12.6 Seconds (9.7-11.5)  H  05/08/17  07:10    


 


INR  1.2  (0.9-1.1)  H  05/08/17  07:10    


 


APTT  36.4 Seconds (24.5-32.8)  H  05/08/17  07:10    


 


Sodium  134 mmol/L (136-145)  L  05/08/17  07:10    


 


Potassium  4.5 mmol/L (3.5-5.0)   05/08/17  07:10    


 


Chloride  101 mmol/L ()   05/08/17  07:10    


 


Carbon Dioxide  33 mmol/L (20-32)  H  05/08/17  07:10    


 


BUN  44 mg/dL (10-26)  H  05/08/17  07:10    


 


Creatinine  1.2 mg/dL (0.4-1.5)   05/08/17  07:10    


 


Estimated Creat Clear  54   05/08/17  07:10    


 


Est GFR ( Amer)  > 60  (60-)  05/08/17  07:10    


 


Est GFR (Non-Af Amer)  > 60  (60-)  05/08/17  07:10    


 


Glucose  198 mg/dL (70-99)  H  05/08/17  07:10    


 


Uric Acid  10.0 mg/dL (2.0-7.8)  H  05/08/17  07:10    


 


Calcium  9.9 mg/dL (8.5-10.5)   05/08/17  07:10    


 


Total Bilirubin  0.5 mg/dL (0.2-1.2)   05/08/17  07:10    


 


AST  19 U/L (0-41)   05/08/17  07:10    


 


ALT  16 U/L (0-45)   05/08/17  07:10    


 


Alkaline Phosphatase  56 U/L ()   05/08/17  07:10    


 


Creatine Kinase  320 U/L (0-225)  H  05/08/17  07:10    


 


Troponin I  < 0.03 ng/mL (0.03-0.06)  L  05/08/17  07:10    


 


NT-Pro-B Natriuret Pep  1917.1 pg/mL (15.0-450.0)  H  05/08/17  07:10    


 


Total Protein  8.4 g/dL (6.0-8.5)   05/08/17  07:10    


 


Albumin  4.4 g/dL (3.0-5.5)   05/08/17  07:10    


 


Urine Color  Yellow  (YELLOW)   05/08/17  08:26    


 


Urine Appearance  Clear  (CLEAR)   05/08/17  08:26    


 


Urine pH  5.0  (5.0 - 8.0)   05/08/17  08:26    


 


Ur Specific Gravity  1.020  (1.010-1.030)   05/08/17  08:26    


 


Urine Protein  1+ mg/dL (NEGATIVE)  H  05/08/17  08:26    


 


Urine Ketones  Negative mg/dL (NEGATIVE)   05/08/17  08:26    


 


Urine Occult Blood  Negative  (NEGATIVE)   05/08/17  08:26    


 


Urine Nitrite  Negative  (NEGATIVE)   05/08/17  08:26    


 


Urine Bilirubin  Negative  (NEGATIVE)   05/08/17  08:26    


 


Urine Urobilinogen  0.2 Eu (0.2-1.0)   05/08/17  08:26    


 


Ur Leukocyte Esterase  1+  (NEGATIVE)  H  05/08/17  08:26    


 


Urine Glucose  Negative mg/dL (NEGATIVE)   05/08/17  08:26    

















Assessment/Plan





- Assessment/Plan


(1) Gout attack


Status: Acute   Current Visit: Yes   


Qualifiers: 


   Gout site: hand   Gout etiology: unspecified cause   Laterality: right   

Qualified Code(s): M10.9 - Gout, unspecified   


Narrative Support Text: 


Improved but patient is still having some difficulties with ambulation.








(2) Sjogren's syndrome


Status: Chronic   Current Visit: Yes   


Qualifiers: 


   Sjogren's organ involvement: unspecified organ involvement   Qualified Code(s

): M35.00 - Sicca syndrome, unspecified   





(3) CHF (congestive heart failure)


Status: Acute   Current Visit: No   


Qualifiers: 


   Congestive heart failure type: unspecified congestive heart failure type   

Congestive heart failure chronicity: chronic   Qualified Code(s): I50.9 - Heart 

failure, unspecified   


Assessment: 


stable








(4) Diabetes mellitus type 2


Status: Chronic   Current Visit: No   


Assessment: 


increase BS with steroids

## 2017-07-18 ENCOUNTER — HOSPITAL ENCOUNTER (OUTPATIENT)
Dept: HOSPITAL 44 - CARD | Age: 77
End: 2017-07-18
Attending: INTERNAL MEDICINE
Payer: MEDICARE

## 2017-07-18 DIAGNOSIS — E78.5: ICD-10-CM

## 2017-07-18 DIAGNOSIS — I50.9: ICD-10-CM

## 2017-07-18 DIAGNOSIS — I82.409: ICD-10-CM

## 2017-07-18 DIAGNOSIS — Z95.4: Primary | ICD-10-CM

## 2017-07-18 DIAGNOSIS — I49.8: ICD-10-CM

## 2017-07-18 DIAGNOSIS — E11.9: ICD-10-CM

## 2017-08-14 ENCOUNTER — HOSPITAL ENCOUNTER (OUTPATIENT)
Dept: HOSPITAL 44 - OUT | Age: 77
End: 2017-08-14
Attending: ANESTHESIOLOGY
Payer: MEDICARE

## 2017-08-14 DIAGNOSIS — M54.5: ICD-10-CM

## 2017-08-14 DIAGNOSIS — M54.16: Primary | ICD-10-CM

## 2017-08-14 DIAGNOSIS — M47.896: ICD-10-CM

## 2017-08-14 PROCEDURE — 99214 OFFICE O/P EST MOD 30 MIN: CPT

## 2017-08-14 PROCEDURE — 62323 NJX INTERLAMINAR LMBR/SAC: CPT

## 2017-08-15 NOTE — LESI WITH FLUORO
SUBJECTIVE:

Ms. Foy comes in today in follow up.  I saw her a year ago for right lower 
extremity radiculitis which resolved with an epidural steroid injection.  Her 
last imaging was significant for multi-level degenerative disk disease and 
facet spondylosis without edgardo spinal stenosis.  The epidural injection at 
that time worked very well.  She underwent valve replacement for congestive 
heart failure last year and she has had a good deal of time being hospitalized 
and in recovery; and since that time, her back pain has begun to return.  She 
complains of relatively severe low back pain, right equals left, axial and 
across the back without symptoms of neurogenic claudication or radiculitis.  I 
re-examined her today and she has a negative straight leg raise, a mildly 
positive assisted extension and extension rotation.  She has no recent imaging.
  Plan today for a right-sided palliative L5-S1 epidural steroid injection with 
fluoroscopic guidance.  



OPERATIVE PROCEDURE:

Right L5-S1 epidural steroid injection with fluoroscopic guidance. 



DESCRIPTION OF PROCEDURE:

The risks and benefits were discussed with the patient including the risk of 
infection, bleeding,  nerve injury, and headache, as well as the risks of 
steroid exposure causing hyperglycemia, hypertension, osteoporosis, or 
increased infectious risks.   The patient understood these risks and agreed to 
proceed. Consent was obtained prior to the procedure. 



The patient was placed in the prone position on the fluoroscopy table with a 
pillow underneath the abdomen to afford anterior flexion of the lumbar spine. 
The low back was cleaned and a sterile drape was applied.  A 23-gauge thin wall 
Tuohy epidural needle was advanced with normal saline loss of resistance 
technique and direct fluoroscopic guidance  with a right paramedian approach at 
the L5-S1 level. On obtaining loss of resistance to normal saline, it was 
verified that there was no aspiration of CSF or blood. Furthermore, the needle 
tip location was verified with lateral and AP fluoroscopic views.  Omnipaque 
240 myelogram dye was injected through the epidural needle.  The distribution 
of the dye was noted to be within the desired distribution within the lumbar 
epidural space.  The patient did report some reproduction of the low back and /
or lower extremity pain symptoms; this reproduction of symptoms was short-
lived.  Medications were injected into the epidural space. The stylet was 
replaced in the needle and the needle was removed from the back. 



The patient tolerated the procedure well. The back was cleaned and a bandage 
was applied over the injection site. The patient was monitored for 20 minutes 
following the procedure. during this time the vital signs remained stable and 
the patient experienced no adverse sequelae. The patient was discharged in good 
condition. 



ASSESSMENT:

1.   History of lumbar radiculitis with good response to previous epidural 
injection. 

2.   Possible lumbar spondylosis. 

3.   Severe low back pain. 



PLAN: 

Right-sided palliative L5-S1 epidural steroid injection with fluoroscopic 
guidance today.  I would recommend new MRI study.  If she is not improved in 
the next couple of weeks, I would consider placing diagnostic facet medial 
branch blocks.



FOLLOWUP:

Return to clinic if problems develop or worsen.





cc:   Dr. Raphael APARICIO

## 2017-09-11 ENCOUNTER — HOSPITAL ENCOUNTER (OUTPATIENT)
Dept: HOSPITAL 44 - OUT | Age: 77
End: 2017-09-11
Attending: ANESTHESIOLOGY
Payer: MEDICARE

## 2017-09-11 DIAGNOSIS — M48.00: Primary | ICD-10-CM

## 2017-09-11 DIAGNOSIS — M54.89: ICD-10-CM

## 2017-09-11 PROCEDURE — 99213 OFFICE O/P EST LOW 20 MIN: CPT

## 2017-09-12 NOTE — PAIN CLINIC PROGRESS NOTES
REASON FOR VISIT: 

Ms. Foy follows up with me today.  She continues to have back pain.  I had 
placed an epidural a year ago which had given her great relief.  The epidural 
from last month has not been very effective.  I believe she has lumbar 
spondylosis as her underlying complaint.  Her MRI comes back with multi-level 
neural foraminal stenosis, but I do not detect any radiculitis on exam today. 



PLAN:  

We will plan for bilateral lumbar facet medial branch blocks.  She has 
unfortunately not stopped her Eliquis, and I will need to follow her up off of 
Eliquis for 3 days.   She is in agreement and we will follow her up as soon as 
possible.  







cc:   Dr. Raphael APARICIO

## 2017-09-30 ENCOUNTER — HOSPITAL ENCOUNTER (EMERGENCY)
Dept: HOSPITAL 44 - ED | Age: 77
Discharge: HOME | End: 2017-09-30
Payer: MEDICARE

## 2017-09-30 VITALS
SYSTOLIC BLOOD PRESSURE: 132 MMHG | SYSTOLIC BLOOD PRESSURE: 132 MMHG | DIASTOLIC BLOOD PRESSURE: 63 MMHG | DIASTOLIC BLOOD PRESSURE: 63 MMHG | DIASTOLIC BLOOD PRESSURE: 63 MMHG | SYSTOLIC BLOOD PRESSURE: 132 MMHG
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
## 2017-09-30 DIAGNOSIS — S60.222A: ICD-10-CM

## 2017-09-30 DIAGNOSIS — S70.02XA: Primary | ICD-10-CM

## 2017-09-30 PROCEDURE — 90715 TDAP VACCINE 7 YRS/> IM: CPT

## 2017-09-30 PROCEDURE — 90471 IMMUNIZATION ADMIN: CPT

## 2017-09-30 PROCEDURE — 72170 X-RAY EXAM OF PELVIS: CPT

## 2017-09-30 PROCEDURE — 73130 X-RAY EXAM OF HAND: CPT

## 2017-09-30 PROCEDURE — 99283 EMERGENCY DEPT VISIT LOW MDM: CPT

## 2017-09-30 NOTE — ED PHYSICIAN DOCUMENTATION
Fall





- HISTORIAN


Historian: patient





- HPI


Stated Complaint: fall


Chief Complaint: Fall


Onset: just prior to arrival


Where: home


Context: tripped (over box on the floor)


r: moderate


Associated Symptoms:: no loss of consciousness


Location of Pain/Injury: upper extremity, lower extremity, hip.  denies: head, 

neck, face


Injury to Left Extremity: hand (3rd finger), hip





- ROS


CONST: no problems.  denies: fever





- PAST HX


Past History: diabetes Type 2


Allergies/Adverse Reactions: 


 Allergies











Allergy/AdvReac Type Severity Reaction Status Date / Time


 


Sulfa (Sulfonamide Allergy Unknown Generalized Verified 17 15:20





Antibiotics)   Swelling  














Home Medications: 


 Ambulatory Orders











 Medication  Instructions  Recorded


 


Carvedilol [Coreg] 6.25 mg PO Q12 17


 


Citalopram Hydrobromide 20 mg PO DAILY 17





[Citalopram HBr]  


 


Docusate Sodium [Colace] 100 - 300 mg PO DAILY 17


 


[place]-3S/Dha/Epa/Fish Oil [Fish 1 each PO D 17





Oil 1,200 mg Softgel]  


 


Ergocalciferol (Vitamin D2) 400 unit PO D 02/15/17





[Vitamin D]  


 


Prednisone 5 mg PO AM 17


 


predniSONE [Deltasone] 2.5 mg PO PM 17


 


Furosemide [Lasix] 20 mg PO BID 17


 


Allopurinol [Zyloprim] 100 mg PO DAILY #30 05/10/17














- VITAL SIGNS


Vital Signs: 





 Vital Signs











Temp Pulse Resp BP Pulse Ox


 


 98.2 F   78   18   145/92   98 


 


 17 15:16  17 15:16  17 15:16  17 15:16  17 15:16














ED Results Lab/Radiology





- Radiology


Radiology Impressions: 


jolene:   EVELIA JAIMES       Date:   Sep 30, 2017 3:51:32 PM CDT


MRN:          Modality Type:   CR


Gender:   F       Description:   PELVIS


:   36       Institution:   [facility]


Physician:   RAPHAEL VILLANUEVA


         


 


 


Pelvis AP view with left hip 2 views 





Date of Exam: 2017. 





History:  AP PELVIS WITH LEFT HIP 2 VIEWS. PT STATES PAIN IN LEFT HIP AFTER 

FALL (Hx) / PAIN POST FALL (DICOM Hx) / PAIN POST FALL (Pt comments) 





Findings: 


The bones are diffusely osteopenic. A right hip prosthesis is noted. 

Degenerative osteoarthritic changes on the left hip are present. There is no 

evidence of acute fracture or dislocation. The left pubic rami are intact. 

There is degenerative lumbosacral spondylosis. The sigmoid colon and rectum is 

filled with stool. 








Impression: 





No acute fracture or hip dislocation.





Left hand 3 views 





Date of Exam: 2017. 





History:  PT STATES LEFT HAND PAIN AFTER FALL TODAY (Hx) / PAIN 3RD DIGIT POST 

FALL (DICOM Hx) / PAIN 3RD DIGIT POST FALL (Pt comments) 





Findings: 


The bones are diffusely osteopenic. Degenerative osteoarthritic changes are 

present involving the wrist and interphalangeal joints. There is no evidence of 

acute fracture or dislocation. The radiocarpal alignment is maintained. 








Impression: 





No acute fracture or dislocation. 





Bony osteopenia and degenerative osteoarthritic changes.











- Orders


Orders: 





 ED Orders











 Category Date Time Status


 


 Apply/change dressing QID Care  17 15:32 Active


 


 HAND XRAY [HAND 3 VIEWS OR MORE] [RAD] Stat Exams  17 Taken


 


 PELVIS AP 1 OR 2 VIEWS [RAD] Stat Exams  17 Taken


 


 Diph,Pertuss(Acell),Tet Vac/Pf [Adacel] Med  17 16:00 Discontinued





 0.5 ml IM 1T   


 


 Diph,Pertuss(Acell),Tet Vac/Pf [Adacel] Med  17 15:39 Discontinued





 0.5 ml IM 1T ONE   














Fall Physical Exam





- Physical Exam


Head: non-tender, no swelling, no obvious injury


Neck: non-tender, painless ROM, trachea midline


Resp/CVS: chest non-tender, no ecchymosis, breath sounds nml, no resp. distress

, heart sounds nml.  No: subcutaneous emphysema


Abdomen: soft, no organomegaly, normal bowel sounds, no abdominal bruit, no 

distension, non-tender


Neuro: oriented x3, CN's nml as tested, sensation nml, motor nml, mood/affect 

nml,  nml, reflexes nml


Skin: color nml, no rash


Back: vertebral tenderness (mild lower lumbar)


Extremities: pelvis stable, bony point-tenderness (left hip lateral aspect)


Joint: Nml gait/weight bearing, limited ROM (left hip), painful (distal left 

3rd finger)





- Destrehan Coma Score


Eyes Open: Spontaneous


Speech: Oriented


Motor: Obeys Commands





Discharge


Clincal Impression: 


 Contusion





Referrals: 


Raphael Villanueva MD [Primary Care Provider] - 2 Days


Additional Instructions: 


Cool compress to the areas of contusion. Take Tramadol that you have at home as 

needed for pain. 


Condition: Stable


Disposition: 01 HOME, SELF-CARE


Decision to Admit: NO


Date of Decison to Admit: 17


Decision Time: 17:07

## 2017-09-30 NOTE — DIAGNOSTIC IMAGING REPORT
KENNA VILLANUEVA 

[facility]

[address] [facility] [address] [address]

 

 

 

 

Report Submission Date: Sep 30, 2017 4:17:49 PM CDT

Patient       Study

Name:   EVELIA JAIMES       Date:   Sep 30, 2017 3:43:20 PM CDT

MRN:          Modality Type:   CR

Gender:   F       Description:   UPPER EXTREMITY

:   36       Institution:   [facility]

Physician:   KENNA VILLANUEVA

         

 

 

Left hand 3 views 



Date of Exam: 2017. 



History:  PT STATES LEFT HAND PAIN AFTER FALL TODAY (Hx) / PAIN 3RD DIGIT POST 
FALL (DICOM Hx) / PAIN 3RD DIGIT POST FALL (Pt comments) 



Findings: 

The bones are diffusely osteopenic. Degenerative osteoarthritic changes are 
present involving the wrist and interphalangeal joints. There is no evidence of 
acute fracture or dislocation. The radiocarpal alignment is maintained. 





Impression: 



No acute fracture or dislocation. 



Bony osteopenia and degenerative osteoarthritic changes.

 

Electronically signed on Sep 30, 2017 4:17:49 PM CDT by:

Chris APARICIO

## 2017-09-30 NOTE — DIAGNOSTIC IMAGING REPORT
KENNA VILLANUEVA 

[facility]

[address] [facility] [address] [address]

 

 

 

 

Report Submission Date: Sep 30, 2017 4:19:47 PM CDT

Patient       Study

Name:   EVELIA JAIMES       Date:   Sep 30, 2017 3:51:32 PM CDT

MRN:          Modality Type:   CR

Gender:   F       Description:   PELVIS

:   36       Institution:   [facility]

Physician:   KENNA VILLANUEVA

         

 

 

Pelvis AP view with left hip 2 views 



Date of Exam: 2017. 



History:  AP PELVIS WITH LEFT HIP 2 VIEWS. PT STATES PAIN IN LEFT HIP AFTER 
FALL (Hx) / PAIN POST FALL (DICOM Hx) / PAIN POST FALL (Pt comments) 



Findings: 

The bones are diffusely osteopenic. A right hip prosthesis is noted. 
Degenerative osteoarthritic changes on the left hip are present. There is no 
evidence of acute fracture or dislocation. The left pubic rami are intact. 
There is degenerative lumbosacral spondylosis. The sigmoid colon and rectum is 
filled with stool. 





Impression: 



No acute fracture or hip dislocation.

 

Electronically signed on Sep 30, 2017 4:19:47 PM CDT by:

Chris APARICIO

## 2017-10-10 ENCOUNTER — HOSPITAL ENCOUNTER (OUTPATIENT)
Dept: HOSPITAL 44 - CARD | Age: 77
End: 2017-10-10
Attending: INTERNAL MEDICINE
Payer: MEDICARE

## 2017-10-10 DIAGNOSIS — Z86.718: ICD-10-CM

## 2017-10-10 DIAGNOSIS — E78.5: ICD-10-CM

## 2017-10-10 DIAGNOSIS — I50.9: Primary | ICD-10-CM

## 2017-10-10 DIAGNOSIS — Z95.4: ICD-10-CM

## 2017-10-17 ENCOUNTER — HOSPITAL ENCOUNTER (OUTPATIENT)
Dept: HOSPITAL 44 - LAB | Age: 77
End: 2017-10-17
Attending: PHYSICIAN ASSISTANT
Payer: MEDICARE

## 2017-10-17 DIAGNOSIS — M79.674: Primary | ICD-10-CM

## 2017-10-17 DIAGNOSIS — M79.89: ICD-10-CM

## 2017-10-17 PROCEDURE — 84550 ASSAY OF BLOOD/URIC ACID: CPT

## 2017-10-17 PROCEDURE — 36415 COLL VENOUS BLD VENIPUNCTURE: CPT

## 2017-11-02 ENCOUNTER — HOSPITAL ENCOUNTER (INPATIENT)
Dept: HOSPITAL 44 - ED | Age: 77
LOS: 2 days | Discharge: HOME | DRG: 195 | End: 2017-11-04
Attending: FAMILY MEDICINE | Admitting: FAMILY MEDICINE
Payer: MEDICARE

## 2017-11-02 VITALS — BODY MASS INDEX: 28.4 KG/M2

## 2017-11-02 DIAGNOSIS — E11.9: ICD-10-CM

## 2017-11-02 DIAGNOSIS — R09.02: ICD-10-CM

## 2017-11-02 DIAGNOSIS — R50.81: ICD-10-CM

## 2017-11-02 DIAGNOSIS — J44.9: ICD-10-CM

## 2017-11-02 DIAGNOSIS — M35.00: ICD-10-CM

## 2017-11-02 DIAGNOSIS — D64.9: ICD-10-CM

## 2017-11-02 DIAGNOSIS — J18.1: Primary | ICD-10-CM

## 2017-11-02 DIAGNOSIS — I50.9: ICD-10-CM

## 2017-11-02 DIAGNOSIS — I10: ICD-10-CM

## 2017-11-02 LAB
EGFR (AFRICAN): > 60
EGFR (NON-AFRICAN): > 60
MCH RBC QN AUTO: 28.2 PG (ref 28–34)
MCV RBC AUTO: 89 FL (ref 80–100)
MONOCYTES %: 4 % (ref 0–11)
PH UR STRIP: 7.5 [PH] (ref 5–8)
SEGMENTED NEUTROPHILS %: 86 % (ref 39–79)
UROBILINOGEN URINE: 1 EU (ref 0.2–1)

## 2017-11-02 PROCEDURE — 87186 SC STD MICRODIL/AGAR DIL: CPT

## 2017-11-02 PROCEDURE — 99283 EMERGENCY DEPT VISIT LOW MDM: CPT

## 2017-11-02 PROCEDURE — 96374 THER/PROPH/DIAG INJ IV PUSH: CPT

## 2017-11-02 PROCEDURE — 83605 ASSAY OF LACTIC ACID: CPT

## 2017-11-02 PROCEDURE — 99238 HOSP IP/OBS DSCHRG MGMT 30/<: CPT

## 2017-11-02 PROCEDURE — 80053 COMPREHEN METABOLIC PANEL: CPT

## 2017-11-02 PROCEDURE — 87040 BLOOD CULTURE FOR BACTERIA: CPT

## 2017-11-02 PROCEDURE — 96375 TX/PRO/DX INJ NEW DRUG ADDON: CPT

## 2017-11-02 PROCEDURE — 36415 COLL VENOUS BLD VENIPUNCTURE: CPT

## 2017-11-02 PROCEDURE — 87070 CULTURE OTHR SPECIMN AEROBIC: CPT

## 2017-11-02 PROCEDURE — 84484 ASSAY OF TROPONIN QUANT: CPT

## 2017-11-02 PROCEDURE — S1016 NON-PVC INTRAVENOUS ADMINIST: HCPCS

## 2017-11-02 PROCEDURE — 71020: CPT

## 2017-11-02 PROCEDURE — 81002 URINALYSIS NONAUTO W/O SCOPE: CPT

## 2017-11-02 PROCEDURE — 85025 COMPLETE CBC W/AUTO DIFF WBC: CPT

## 2017-11-02 PROCEDURE — 82550 ASSAY OF CK (CPK): CPT

## 2017-11-02 PROCEDURE — 99284 EMERGENCY DEPT VISIT MOD MDM: CPT

## 2017-11-02 PROCEDURE — 82270 OCCULT BLOOD FECES: CPT

## 2017-11-02 PROCEDURE — 83036 HEMOGLOBIN GLYCOSYLATED A1C: CPT

## 2017-11-02 PROCEDURE — 80048 BASIC METABOLIC PNL TOTAL CA: CPT

## 2017-11-02 PROCEDURE — 82728 ASSAY OF FERRITIN: CPT

## 2017-11-02 PROCEDURE — 83550 IRON BINDING TEST: CPT

## 2017-11-02 PROCEDURE — 99232 SBSQ HOSP IP/OBS MODERATE 35: CPT

## 2017-11-02 PROCEDURE — 99222 1ST HOSP IP/OBS MODERATE 55: CPT

## 2017-11-02 PROCEDURE — 83540 ASSAY OF IRON: CPT

## 2017-11-02 PROCEDURE — 83880 ASSAY OF NATRIURETIC PEPTIDE: CPT

## 2017-11-02 RX ADMIN — CARVEDILOL SCH MG: 6.25 TABLET, FILM COATED ORAL at 12:05

## 2017-11-02 RX ADMIN — FUROSEMIDE SCH MG: 20 TABLET ORAL at 20:09

## 2017-11-02 RX ADMIN — CARVEDILOL SCH MG: 6.25 TABLET, FILM COATED ORAL at 20:08

## 2017-11-02 RX ADMIN — SODIUM CHLORIDE SCH UNITS: 9 INJECTION, SOLUTION INTRAVENOUS at 17:08

## 2017-11-02 RX ADMIN — ALLOPURINOL SCH MG: 100 TABLET ORAL at 12:07

## 2017-11-02 RX ADMIN — CITALOPRAM SCH MG: 20 TABLET ORAL at 12:04

## 2017-11-02 RX ADMIN — DOCUSATE SODIUM SCH MG: 100 CAPSULE, LIQUID FILLED ORAL at 12:05

## 2017-11-02 RX ADMIN — IPRATROPIUM BROMIDE AND ALBUTEROL SULFATE SCH ML: .5; 3 SOLUTION RESPIRATORY (INHALATION) at 13:19

## 2017-11-02 RX ADMIN — APIXABAN SCH MG: 2.5 TABLET, FILM COATED ORAL at 20:08

## 2017-11-02 RX ADMIN — SODIUM CHLORIDE SCH: 9 INJECTION, SOLUTION INTRAVENOUS at 13:33

## 2017-11-02 RX ADMIN — SODIUM CHLORIDE SCH UNITS: 9 INJECTION, SOLUTION INTRAVENOUS at 20:14

## 2017-11-02 RX ADMIN — IPRATROPIUM BROMIDE AND ALBUTEROL SULFATE SCH ML: .5; 3 SOLUTION RESPIRATORY (INHALATION) at 18:22

## 2017-11-02 RX ADMIN — GABAPENTIN SCH MG: 300 CAPSULE ORAL at 12:07

## 2017-11-02 RX ADMIN — GLIMEPIRIDE SCH MG: 2 TABLET ORAL at 12:04

## 2017-11-02 RX ADMIN — GABAPENTIN SCH MG: 300 CAPSULE ORAL at 20:09

## 2017-11-02 RX ADMIN — FUROSEMIDE SCH MG: 20 TABLET ORAL at 12:07

## 2017-11-02 RX ADMIN — APIXABAN SCH MG: 2.5 TABLET, FILM COATED ORAL at 12:06

## 2017-11-02 RX ADMIN — CEFTRIAXONE SODIUM SCH MLS/HR: 1 INJECTION, POWDER, FOR SOLUTION INTRAMUSCULAR; INTRAVENOUS at 13:27

## 2017-11-02 RX ADMIN — SODIUM CHLORIDE SCH UNITS: 9 INJECTION, SOLUTION INTRAVENOUS at 12:18

## 2017-11-02 NOTE — HISTORY AND PHYSICAL REPORT
History of Present Illnes





- History of Present Illness


Reason for Visit: Dyspnea


History of Present Illness: 





76yo white female who has not been feeling well over the last 2 weeks. Having 

some increase dyspnea.  Over the last three days has developed a productive 

cough of some purulent green/yellow phlegm. This AM began to run a fever, not 

sure how high. Patient states she has been having some wheezing and right 

anterior chest pain associated with coughing. Patient denies any hemoptysis. 

Patient states that her diabetes mellitus has been stable. and was seen in the 

ED. Patient found to have a RLL infiltrate and admitted for treatment of 

pneumonia. Blood sugars have been doing fairly well. 





- Past Medical History


Cardiac: HTN, Hyperlipidemia, Aortic stenosis (s/p valve repalcement).  denies: 

CAD, CHF


Gastrointestinal: GERD


Heme/Onc: Other (DVT after hip surgery , recurrent DVT 2 months ago -on 

anticoagulation therapy)


Psych: Anxiety, Depression


Musculoskeletal: Osteoarthritis, Other (Sojogren's syndrome, s/p DVT)


Rheumatologic: Other (Sjogren syndrom, diverticulosis, )


Renal/: Other (kidney stones)


Endocrine: Diabetes, Other (diabetic neuropathy)





- Past Surgical History


Past Surgical History: Appendectomy, Cholecystectomy, Cataract Removal, Total 

Knee Replacement (bilaterally), Other (shoulder surgery, aortic valve 

replacement, lower back surgery, carpal tunnel release, AP bladder repair.)





- Past Family History


  ** Mother


Family History:  (85yo, dementia)





  ** Father


Family History: Cancer (lung),  (77yo)





  ** Brother 1


Family History: CAD, 





  ** Brother 2


Family History: Hypertension





  ** Brother 3


Family History: Other





- Past Social History


Smoke: No


Occupation: retired, manager of gas station


Alcohol: None


Drugs: None


Lives: With Family ([facility])





- Health Maintenance


Health Maintenance: Tetanus (), Pneumococcal Vaccine (), Mammogram (Aug 

11), DEXA


Pneumonia Vaccine: Yes


Resuscitation Status: 


Resusciation Status





Resuscitation Status             Full Code














- Unable to Obtain History


Unable to Obtain: No





Review of Systems





- Review of Systems


Constitutional: Fever, Chills, Sweats, Weakness


Eyes: negative: pain, conjunctivae inflammation


ENT: negative: Ear Pain, Ear Discharge, Nose Pain, Nose Discharge, Nose 

Congestion, Throat Pain


Respiratory: Cough, Shortness of Breath, Hemoptysis, SOB with Excertion, 

Pleuritic Pain, Sputum (greeen yellow)


Cardiovascular: Chest Pain (anterior with coughing), Palpitations, Edema.  

negative: Orthopnea, Light Headedness


Gastrointestinal: Constipation.  negative: Nausea, Vomiting, Abdominal Pain, 

Hematochezia


Genitourinary: negative: Dysuria, Frequency, Incontinence, Hematuria


Musculoskeletal: Back Pain


Skin: negative: Rash, Lesions


Neurological: Weakness.  negative: Numbness, Incoordination, Change in Speech, 

Confusion, Seizures





- Medications/Allergies


Allergies/Adverse Reactions: 


 Allergies











Allergy/AdvReac Type Severity Reaction Status Date / Time


 


Sulfa (Sulfonamide Allergy Unknown Generalized Verified 17 07:22





Antibiotics)   Swelling  














Home Medications: 


 Home Medications





Glimepiride [Amaryl] 1 PO [id number] 17 


Prenatal Vit/Iron Fumarate/FA [Prenatal Tablet] 1 PO [id number] 17 











Current Inpatient Medications: 


 Current Inpatient Medications





Albuterol/Ipratropium (Duoneb)  3 ml NEB Q6 [facility]


Allopurinol (Zyloprim)  100 mg PO DAILY [facility]


Carvedilol (Coreg)  6.25 mg PO Q12 [facility]


Citalopram Hydrobromide (Celexa)  20 mg PO DAILY [facility]


Docusate Sodium (Colace)  100 mg PO DAILY [facility]


Furosemide (Lasix)  20 mg PO BID [facility]


Gabapentin (Neurontin)  300 mg PO SEE.INSTRUCTIONS [facility]


Glimepiride (Amaryl)  1 mg PO 0730 [facility]


Azithromycin 500 mg/ Sodium (Chloride)  250 mls @ 125 mls/hr IV NOW ONE


   Stop: 17 10:31


   Last Admin: 17 08:48 Dose:  125 mls/hr


Ceftriaxone Sodium 1 gm/ (Sodium Chloride)  50 mls @ 100 mls/hr IV QD [facility]


Sodium Chloride (Normal Saline)  1,000 mls @ 80 mls/hr IV NOW ONE


   Stop: 17 21:00


   Last Admin: 17 08:47 Dose:  80 mls/hr


Azithromycin 250 mg/ Sodium (Chloride)  250 mls @ 125 mls/hr IV Q24H [facility]


   Stop: 17 09:59


Ceftriaxone Sodium 1 gm/ (Sodium Chloride)  50 mls @ 100 mls/hr IV QD [facility]


Insulin Human Regular (Humulin R)  0 - 12 unit SQ CHEMQID [facility]


   PRN Reason: Protocol


Metformin HCl (Glucophage)  1,000 mg PO BID [facility]


Miscellaneous (Chem Sticks)  1 each  CHEMQID [facility]


Prednisone (Deltasone)  5 mg PO AM K[facility]


Tramadol HCl (Ultram)  50 - 100 mg PO Q4 PRN


   PRN Reason: PAIN














Exam





- Exam


Vital Signs: 


 Vital Signs (72 hours)











  17





  09:01


 


Temperature 99.3 F


 


Pulse Rate [ 122 H





Right Pulse ox] 


 


Respiratory 18





Rate 


 


Blood Pressure 136/72





[Left Arm] 


 


O2 Sat by Pulse 93





Oximetry 














General: Alert, Oriented to Person, Oriented to Place, Oriented to Time, 

Cooperative, Moderate distress


HEENT: PERRLA, EOMI, Nose Mucous membr. moist/Pink, Dentition Normal.  No: 

Mouth Mucous membr. moist/Pink (dry mouth breathing)


Neck: Normal Range of Motion.  No: Stridor, Lymphadenopathy


Carotids: 





WNL


Thyroid: 





WNL


Lungs: Normal air movement, Wheezes, Rales (RRL), Rhonchi (few scattered 

bialterally)


Cardiovascular: Regular rate, Normal S1, Normal S2, No murmurs


Abdomen: Normal bowel sounds, Soft, No tenderness, Distended (mild, last BM 

this AM)


Integumentary: Normal, Pink, Warm, Dry


Extremities: No clubbing, Other (2 plus edema bialterally)


Neurological: Normal gait, Normal speech, Strength Equal Bilat, Normal tone, 

Sensation intact, Cranial nerves 3-12 NL


Psych/Mental Status: Mental status NL, Mood NL, Appropriate Affect, Intact 

Judgment





VTE Assessment





- RISK FACTOR SCORE


VTE RISK FACTOR SCORES: AGE OVER 60 YEARS, ACUTE RESPIRATORY FAILURE/SEVERE COPD

, ANTICIPATED BED CONFINEMENT OR IMMOBILIZATION > 24 HOURS





- RISK


VTE HIGH RISK: SCORE OF 3-4 (RISK PROXIMAL DVT 4-8%)  PROPHYLAXIS NEEDED (on 

Eliquis)

## 2017-11-02 NOTE — ED PHYSICIAN DOCUMENTATION
Upper Respiratory Symptoms





- HISTORIAN


Historian: patient, other (daughter)





- HPI


Stated Complaint: CHEST PAIN


Chief Complaint: Cough/ Upper Respiratory


Onset: days ago (Sunday)


Further Comments: yes (77 year old female patient brought in by daughter for 

evaluation of productive cough, weakness, dyspnea and rasho on left ankle and 

foot. Daughter states syptoms started on Sunday and have become progressively 

worse.  Reports increase weakness, unable to get patient from bed to bathroom 

this morning.)





- ROS


CONST/EYES: weakness


CVS/RESP: chest pain, shortness of breath.  denies: palpitations


LYMPH: leg swelling, rash (left ankle foot), ankle swelling


GI/: none


NEURO/PSYCH: denies: fainting, dizziness, confusion, anxiety, depression, other


MS/SKIN: joint pain, rash (left ankle foot).  denies: muscle aches





- PAST HX


Lung Disease: COPD, pneumonia


PE Risk Factors: hypertension, leg swelling


Other History: cardiac disease, AMI, CHF, diabetes Type 2, other (gout, Sjogren'

s, depression, HLD, Aortic stenosis, DVT right sapenous vein on eliquis per Dr Coley)


Surgeries/Procedures: other (AVR 1/23/2017)


Allergies/Adverse Reactions: 


 Allergies











Allergy/AdvReac Type Severity Reaction Status Date / Time


 


Sulfa (Sulfonamide Allergy Unknown Generalized Verified 11/02/17 07:22





Antibiotics)   Swelling  














Home Medications: 


 Ambulatory Orders











 Medication  Instructions  Recorded


 


Carvedilol [Coreg] 6.25 mg PO Q12 02/05/17


 


Citalopram Hydrobromide 20 mg PO DAILY 02/05/17





[Citalopram HBr]  


 


Docusate Sodium [Colace] 100 - 300 mg PO DAILY 02/13/17


 


[place]-3S/Dha/Epa/Fish Oil [Fish 1 each PO D 02/13/17





Oil 1,200 mg Softgel]  


 


Ergocalciferol (Vitamin D2) 400 unit PO D 02/15/17





[Vitamin D]  


 


Prednisone 5 mg PO AM 02/25/17


 


predniSONE [Deltasone] 2.5 mg PO PM 02/25/17


 


Furosemide [Lasix] 20 mg PO BID 05/08/17


 


Allopurinol [Zyloprim] 100 mg PO DAILY #30 05/10/17


 


Glimepiride [Amaryl] 1 PO [id number] 11/02/17


 


Prenatal Vit/Iron Fumarate/FA 1 PO [id number] 11/02/17





[Prenatal Tablet]  














- SOCIAL HX


Smoking History: non-smoker





- FAMILY HX


Family History: denies: none





- VITAL SIGNS


Vital Signs: 


 Vital Signs











Temp Pulse Resp BP Pulse Ox


 


 99.3 F   122 H  18   136/72   93 


 


 11/02/17 09:01  11/02/17 09:01  11/02/17 09:01  11/02/17 09:01  11/02/17 09:01














- REVIEWED ASSESSMENTS


Nursing Assessment  Reviewed: Yes


Vitals Reviewed: Yes





Progress





- Progress


Progress: 





Reviewed lab and xray results with daughter.  Discussed admission at [facility] vs 

transfer.  Daughter prefers [facility] admission.  





Call to Dr Harvey.  Admit to Med surg, 1 time dose of lasix IV, start NS at 

80cc/hr, rocephin qd and azithromycin, back order [id number]. 





Dr Nieto at bedside.  Obtained sputum culture, added lactate.  Temp down to 99.3





ED Results Lab/Radiology





- Lab Results


Lab Results: 


 Lab Results











  11/02/17 11/02/17 11/02/17





  07:10 07:10 07:10


 


WBC      





    


 


RBC      





    


 


Hgb      





    


 


Hct      





    


 


MCV      





    


 


MCH      





    


 


MCHC      





    


 


RDW      





    


 


Plt Count      





    


 


Seg Neutrophils %      





    


 


Band Neutrophils %      





    


 


Lymphocytes %      





    


 


Monocytes %      





    


 


Plt Morphology Comment      





    


 


RBC Morph Comment      





    


 


Sodium      134 mmol/L L mmol/L





     (137-145) 


 


Potassium      4.4 mmol/L mmol/L





     (3.5-5.1) 


 


Chloride      95 mmol/L L mmol/L





     () 


 


Carbon Dioxide      29 mmol/L mmol/L





     (22-30) 


 


BUN      35 mg/dL H mg/dL





     (7-17) 


 


Creatinine      1.20 mg/dL H mg/dL





     (0.52-1.04) 


 


Estimated Creat Clear      56 





    


 


Est GFR ( Amer)      > 60 





    (60 - )


 


Est GFR (Non-Af Amer)      > 60 





    (60 - )


 


Glucose      192 mg/dL H mg/dL





     () 


 


Calcium      8.8 mg/dL mg/dL





     (8.4-10.2) 


 


Total Bilirubin      0.4 mg/dL mg/dL





     (0.2-1.3) 


 


AST      17 U/L U/L





     (15-46) 


 


ALT      25 U/L U/L





     (13-69) 


 


Alkaline Phosphatase      60 U/L U/L





     () 


 


Creatine Kinase      35 U/L U/L





     () 


 


Troponin I    < 0.03 ng/mL L ng/mL  





    (0.03-0.06)  


 


NT-Pro-B Natriuret Pep  1370.2 pg/mL H pg/mL    





   (15.0-450.0)   


 


Total Protein      7.3 g/dL g/dL





     (6.3-8.2) 


 


Albumin      3.7 g/dL g/dL





     (3.5-5.0) 














  11/02/17





  07:10


 


WBC  11.80 K/ul K/ul





   (4.00-12.00) 


 


RBC  3.58 M/ul L M/ul





   (3.90-5.20) 


 


Hgb  10.1 g/dL L g/dL





   (12.0-16.0) 


 


Hct  31.9 % L %





   (34.5-46.5) 


 


MCV  89.0 fl fl





   (80.0-100.0) 


 


MCH  28.2 pg pg





   (28.0-34.0) 


 


MCHC  31.7 g/dL g/dL





   (30.0-36.0) 


 


RDW  15.3 % H %





   (11.3-14.3) 


 


Plt Count  198 K/mm3 K/mm3





   (130-400) 


 


Seg Neutrophils %  86 % H %





   (39-79) 


 


Band Neutrophils %  1 % %





   (0-12) 


 


Lymphocytes %  9 % L %





   (16-50) 


 


Monocytes %  4 % %





   (0-11) 


 


Plt Morphology Comment  Normal 





   (NORMAL) 


 


RBC Morph Comment  Normal 





   (NORMAL) 


 


Sodium  





  


 


Potassium  





  


 


Chloride  





  


 


Carbon Dioxide  





  


 


BUN  





  


 


Creatinine  





  


 


Estimated Creat Clear  





  


 


Est GFR ( Amer)  





  


 


Est GFR (Non-Af Amer)  





  


 


Glucose  





  


 


Calcium  





  


 


Total Bilirubin  





  


 


AST  





  


 


ALT  





  


 


Alkaline Phosphatase  





  


 


Creatine Kinase  





  


 


Troponin I  





  


 


NT-Pro-B Natriuret Pep  





  


 


Total Protein  





  


 


Albumin  





  














- Radiology


Radiology Impressions: 








HISTORY:  77-year-old female with chest pain and chest congestion.





COMPARISON: Frontal chest x-ray dated 05/08/2017.





TECHNIQUE: 2 views of the chest were performed.





FINDINGS: There are postoperative changes of ascending thoracic aortic 

endovascular stent and right shoulder arthroplasty.  Mild elevation of the 

right hemidiaphragm is stable.  There is a mild opacity in the right lower lobe 

posteriorly, better seen on the lateral film, likely new versus the previous 

frontal view of the chest.  No pneumothorax or pulmonary edema.  The heart is 

borderline enlarged. There is thoracic degenerative disc disease.





IMPRESSION:





1.  Right lower lobe opacity may represent pneumonia, atelectasis, and/or 

scarring.  Although this appears new since the chest x-ray dated 05/08/2017, 

there are some technical differences between the studies. Consider short 

interval follow-up PA and lateral views of the chest to reevaluate.


2.  Postoperative changes of ascending thoracic aortic endovascular stent and 

right shoulder total arthroplasty.





- Orders


Orders: 


 ED Orders











 Category Date Time Status


 


 Assess pulse oximetry Q1H Care  11/02/17 06:52 Active


 


 Place IV Lock 1T Care  11/02/17 06:49 Active


 


 CHEST P.A.&LAT 2 VIEWS [RAD] Stat Exams  11/02/17 Completed


 


 BLOOD CULTURE Stat Lab  11/02/17 07:40 Received


 


 BNP [NT-proBNP] Stat Lab  11/02/17 07:10 Completed


 


 CBC/PLATELET/DIFF Routine Lab  11/02/17 07:10 Completed


 


 CMP Routine Lab  11/02/17 07:10 Completed


 


 CREATINE KINASE Routine Lab  11/02/17 07:10 Completed


 


 GLYCOHEMOGLOBIN A1C with eAG Routine Lab  11/02/17 07:10 Received


 


 LACTATE Stat Lab  11/02/17 07:20 Received


 


 SPUTUM CULTURE Stat Lab  11/02/17 Ordered


 


 TROPONIN I (cTnI) Stat Lab  11/02/17 07:10 Completed


 


 UA W/MICRO IF INDICATED Stat Lab  11/02/17 07:37 Ordered


 


 0.9 % Sodium Chloride [Normal Saline] 1,000 ml Med  11/02/17 08:33 Discontinued





 IV .STK-MED   


 


 0.9 % Sodium Chloride [Normal Saline] 1,000 ml Med  11/02/17 08:31 Active





 IV NOW   


 


 Allopurinol [Zyloprim] Med  11/02/17 09:00 Ordered





 100 mg PO DAILY   


 


 Apixaban [Eliquis] Med  11/02/17 09:00 Ordered





 2.5 mg PO BID   


 


 Azithromycin [Zithromax] 500 mg Med  11/02/17 08:32 Active





 0.9 % Sodium Chloride [Sodium Chloride] 250 ml   





 IV NOW   


 


 Carvedilol [Coreg] Med  11/02/17 09:00 Ordered





 6.25 mg PO Q12   


 


 Citalopram Hydrobromide [Celexa] Med  11/02/17 09:00 Ordered





 20 mg PO DAILY   


 


 Docusate Sodium [Colace] Med  11/02/17 09:00 Ordered





 100 mg PO DAILY   


 


 Furosemide [Lasix] Med  11/02/17 08:32 Discontinued





 20 mg IVP NOW ONE   


 


 Furosemide [Lasix] Med  11/02/17 09:00 Ordered





 20 mg PO BID   


 


 Furosemide [Lasix] Med  11/02/17 08:33 Discontinued





 40 mg .ROUTE .STK-MED ONE   


 


 Gabapentin [Neurontin] Med  11/02/17 09:00 Ordered





 300 mg PO SEE.INSTRUCTIONS   


 


 Glimepiride [Amaryl] Med  11/02/17 09:00 Ordered





 1 mg PO 0730   


 


 Ipratropium/Albuterol Sulfate [Duoneb] Med  11/02/17 07:01 Discontinued





 3 ml NEB STAT STA   


 


 Metformin HCl [Glucophage] Med  11/02/17 09:00 Ordered





 1,000 mg PO BID   


 


 Ondansetron HCl/Pf [Zofran 4 mg/2 ml] Med  11/02/17 07:29 Discontinued





 4 mg IVP NOW ONE   


 


 cefTRIAXone SODIUM [Rocephin] 1 gm Med  11/02/17 09:00 Ordered





 0.9 % Sodium Chloride [Sodium Chloride] 50 ml   





 IV QD   


 


 predniSONE [Deltasone] Med  11/02/17 09:00 Ordered





 5 mg PO AM   


 


 EKG WITH COMPARISON Stat Ther  11/02/17 Ordered














Upper Respiratory Symptoms





- EXAM


General Appearance: moderate distress


EENT: eyes nml inspection, nml ENT inspection, lids & conjunct. nml, PERRL, ear 

nml, nose nml, pharynx nml, airway nml


Respiratory: no pain on inspiration, speaks full sentences, decreased air 

movement (bases; R>L), rhonchi (bases), chest wall tenderness (reproducible 

with palpation), other (RA sat 87%, dyspnea on exerction, productive cough)


Abdomen: non-tender, no organomegaly, nml bowel sounds, no distention


CVS: heart sounds normal, equal pulses, no murmur, no gallop, PMI nml, no JVD, 

no friction rub, tachycardia


Skin: warm,dry, other (multiple ecchymotic areas noted on bilateral arms)


Extremities: no evidence of injury, edema (2+)


Neuro/Psych: oriented x3, neuro intact, mood/affect nml, CN's nml as tested





Discharge


Clincal Impression: 


 Weakness





CAP (community acquired pneumonia)


Qualifiers:


 Laterality: right Lung location: lower lobe of lung Qualified Code(s): J18.1 - 

Lobar pneumonia, unspecified organism





CHF (congestive heart failure)


Qualifiers:


 Congestive heart failure type: unspecified congestive heart failure type 

Congestive heart failure chronicity: acute on chronic Qualified Code(s): I50.9 

- Heart failure, unspecified





Sjogren's syndrome


Qualifiers:


 Sjogren's organ involvement: unspecified organ involvement Qualified Code(s): 

M35.00 - Sicca syndrome, unspecified





Fever


Qualifiers:


 Fever type: due to other condition Qualified Code(s): R50.81 - Fever 

presenting with conditions classified elsewhere





Condition: Stable


Disposition: 01 HOME, SELF-CARE


Decision to Admit: NO


Decision Time: 08:45

## 2017-11-02 NOTE — DIAGNOSTIC IMAGING REPORT
TY HUYNH 

[facility]

[address] [address]

[place], [place]. [address]

 

 

 

 

Report Submission Date: 2017 7:21:25 AM CDT

Patient       Study

Name:   EVELIA JAIMES       Date:   2017 7:06:06 AM CDT

MRN:          Modality Type:   CR

Gender:   F       Description:   CHEST

:   36       Institution:   [facility]

Physician:   TY HUYNH

         

 

 

HISTORY:  77-year-old female with chest pain and chest congestion. 



COMPARISON: Frontal chest x-ray dated 2017. 



TECHNIQUE: 2 views of the chest were performed. 



FINDINGS: There are postoperative changes of ascending thoracic aortic 
endovascular stent and right shoulder arthroplasty.  Mild elevation of the 
right hemidiaphragm is stable.  There is a mild opacity in the right lower lobe 
posteriorly, better seen on the lateral film, likely new versus the previous 
frontal view of the chest.  No pneumothorax or pulmonary edema.  The heart is 
borderline enlarged. There is thoracic degenerative disc disease. 



IMPRESSION: 



1.  Right lower lobe opacity may represent pneumonia, atelectasis, and/or 
scarring.  Although this appears new since the chest x-ray dated 2017, 
there are some technical differences between the studies. Consider short 
interval follow-up PA and lateral views of the chest to reevaluate. 

2.  Postoperative changes of ascending thoracic aortic endovascular stent and 
right shoulder total arthroplasty.

 

Electronically signed on 2017 7:21:25 AM CDT by:

Jf APARICIO

## 2017-11-03 LAB
BASOPHILS NFR BLD: 0.1 % (ref 0–1.5)
EGFR (AFRICAN): > 60
EGFR (NON-AFRICAN): 42
EOSINOPHIL NFR BLD: 0.3 % (ref 0–6.8)
MCH RBC QN AUTO: 28.1 PG (ref 28–34)
MCV RBC AUTO: 89.1 FL (ref 80–100)
MONOCYTES %: 1.9 % (ref 0–11)
NEUTROPHILS #: 10.3 # K/UL (ref 1.4–7.7)

## 2017-11-03 RX ADMIN — FUROSEMIDE SCH MG: 20 TABLET ORAL at 20:20

## 2017-11-03 RX ADMIN — SODIUM CHLORIDE SCH: 9 INJECTION, SOLUTION INTRAVENOUS at 07:43

## 2017-11-03 RX ADMIN — PANTOPRAZOLE SODIUM SCH MG: 40 TABLET, DELAYED RELEASE ORAL at 06:15

## 2017-11-03 RX ADMIN — DOCUSATE SODIUM SCH MG: 100 CAPSULE, LIQUID FILLED ORAL at 08:18

## 2017-11-03 RX ADMIN — CEFTRIAXONE SODIUM SCH MLS/HR: 1 INJECTION, POWDER, FOR SOLUTION INTRAMUSCULAR; INTRAVENOUS at 10:22

## 2017-11-03 RX ADMIN — CITALOPRAM SCH MG: 20 TABLET ORAL at 08:17

## 2017-11-03 RX ADMIN — IPRATROPIUM BROMIDE AND ALBUTEROL SULFATE SCH ML: .5; 3 SOLUTION RESPIRATORY (INHALATION) at 00:44

## 2017-11-03 RX ADMIN — SODIUM CHLORIDE SCH UNITS: 9 INJECTION, SOLUTION INTRAVENOUS at 16:36

## 2017-11-03 RX ADMIN — APIXABAN SCH MG: 2.5 TABLET, FILM COATED ORAL at 08:19

## 2017-11-03 RX ADMIN — IPRATROPIUM BROMIDE AND ALBUTEROL SULFATE SCH ML: .5; 3 SOLUTION RESPIRATORY (INHALATION) at 17:49

## 2017-11-03 RX ADMIN — IPRATROPIUM BROMIDE AND ALBUTEROL SULFATE SCH ML: .5; 3 SOLUTION RESPIRATORY (INHALATION) at 12:20

## 2017-11-03 RX ADMIN — GABAPENTIN SCH MG: 300 CAPSULE ORAL at 08:20

## 2017-11-03 RX ADMIN — CARVEDILOL SCH MG: 6.25 TABLET, FILM COATED ORAL at 08:18

## 2017-11-03 RX ADMIN — CARVEDILOL SCH MG: 6.25 TABLET, FILM COATED ORAL at 20:19

## 2017-11-03 RX ADMIN — IPRATROPIUM BROMIDE AND ALBUTEROL SULFATE SCH ML: .5; 3 SOLUTION RESPIRATORY (INHALATION) at 06:29

## 2017-11-03 RX ADMIN — GLIMEPIRIDE SCH MG: 2 TABLET ORAL at 08:17

## 2017-11-03 RX ADMIN — GABAPENTIN SCH MG: 300 CAPSULE ORAL at 20:19

## 2017-11-03 RX ADMIN — APIXABAN SCH MG: 2.5 TABLET, FILM COATED ORAL at 20:19

## 2017-11-03 RX ADMIN — SODIUM CHLORIDE SCH UNITS: 9 INJECTION, SOLUTION INTRAVENOUS at 20:22

## 2017-11-03 RX ADMIN — FUROSEMIDE SCH MG: 20 TABLET ORAL at 08:20

## 2017-11-03 RX ADMIN — ALLOPURINOL SCH MG: 100 TABLET ORAL at 08:20

## 2017-11-03 RX ADMIN — SODIUM CHLORIDE SCH UNITS: 9 INJECTION, SOLUTION INTRAVENOUS at 11:30

## 2017-11-03 NOTE — INPATIENT PROGRESS NOTE
Subjective





- Required Recertification Statement


I anticipate X number of days because-include discharge plan: 2





- Review of Systems


Subjective: 





Patient feeling better.  Breathing is better.  Slept very well.





Objective





- Exam


Vitals and I&O: 


 Vital Signs











Temp  98.5 F   11/03/17 07:51


 


Pulse  87   11/03/17 07:51


 


Resp  16   11/03/17 07:51


 


BP  114/53   11/03/17 07:51


 


Pulse Ox  93   11/03/17 07:51











 Intake & Output











 11/02/17 11/02/17 11/03/17





 11:59 23:59 11:59


 


Intake Total  600 


 


Output Total  750 600


 


Balance  -150 -600


 


Weight 82.327 kg  83.733 kg


 


Intake:   


 


  Oral  600 


 


Output:   


 


  Urine  750 600


 


Other:   


 


  Voiding Method Toilet Toilet 


 


  # Voids  2 


 


  # Bowel Movements  0 














General: Alert, Oriented to Person, Oriented to Place, Oriented to Time, 

Cooperative, No acute distress


Lungs: Normal air movement, Speaks full Sentences, Rhonchi





- Results


Results: 


 Laboratory Results











WBC  11.60 K/ul (4.00-12.00)   11/03/17  06:10    


 


RBC  3.10 M/ul (3.90-5.20)  L  11/03/17  06:10    


 


Hgb  8.7 g/dL (12.0-16.0)  L  11/03/17  06:10    


 


Hct  27.7 % (34.5-46.5)  L  11/03/17  06:10    


 


MCV  89.1 fl (80.0-100.0)   11/03/17  06:10    


 


MCH  28.1 pg (28.0-34.0)   11/03/17  06:10    


 


MCHC  31.5 g/dL (30.0-36.0)   11/03/17  06:10    


 


RDW  14.9 % (11.3-14.3)  H  11/03/17  06:10    


 


Plt Count  172 K/mm3 (130-400)   11/03/17  06:10    


 


Neut % (Auto)  88.7 % (39.0-79.0)  H  11/03/17  06:10    


 


Lymph % (Auto)  8.0 % (16.0-50.0)  L  11/03/17  06:10    


 


Mono % (Auto)  1.9 % (0.0-11.0)   11/03/17  06:10    


 


Eos % (Auto)  0.3 % (0.0-6.8)   11/03/17  06:10    


 


Baso % (Auto)  0.1  (0.0-1.5)   11/03/17  06:10    


 


Neut # (Auto)  10.3 # k/uL (1.4-7.7)  H  11/03/17  06:10    


 


Lymph # (Auto)  0.9 # k/uL (0.6-4.0)   11/03/17  06:10    


 


Mono # (Auto)  0.2 # k/uL (0.0-0.9)   11/03/17  06:10    


 


Eos # (Auto)  0.0 # k/uL (0.0-0.6)   11/03/17  06:10    


 


Baso # (Auto)  0.0 # k/uL (0.0-0.5)   11/03/17  06:10    


 


Seg Neutrophils %  86 % (39-79)  H  11/02/17  07:10    


 


Band Neutrophils %  1 % (0-12)   11/02/17  07:10    


 


Lymphocytes %  9 % (16-50)  L  11/02/17  07:10    


 


Reactive Lymphs %  1.0 % (0.0-5.0)   11/03/17  06:10    


 


Monocytes %  4 % (0-11)   11/02/17  07:10    


 


Reactive Lymphs #  0.1 # k/uL (0.0-0.8)   11/03/17  06:10    


 


Plt Morphology Comment  Normal  (NORMAL)   11/02/17  07:10    


 


RBC Morph Comment  Normal  (NORMAL)   11/02/17  07:10    


 


Sodium  137 mmol/L (137-145)   11/03/17  06:10    


 


Potassium  4.2 mmol/L (3.5-5.1)   11/03/17  06:10    


 


Chloride  99 mmol/L ()   11/03/17  06:10    


 


Carbon Dioxide  31 mmol/L (22-30)  H  11/03/17  06:10    


 


BUN  33 mg/dL (7-17)  H  11/03/17  06:10    


 


Creatinine  1.30 mg/dL (0.52-1.04)  H  11/03/17  06:10    


 


Estimated Creat Clear  54   11/03/17  06:10    


 


Est GFR ( Amer)  > 60  (60-)  11/03/17  06:10    


 


Est GFR (Non-Af Amer)  42  (60-) L  11/03/17  06:10    


 


Glucose  90 mg/dL ()   11/03/17  06:10    


 


Estimat Average Glucose  177 mg/dL  11/02/17  07:10    


 


Hemoglobin A1c  7.8 % (4.0-5.6)  H  11/02/17  07:10    


 


Lactate  1.5 U/L (0.7-2.1)   11/02/17  07:20    


 


Calcium  8.5 mg/dL (8.4-10.2)   11/03/17  06:10    


 


Total Bilirubin  0.2 mg/dL (0.2-1.3)   11/03/17  06:10    


 


AST  15 U/L (15-46)   11/03/17  06:10    


 


ALT  27 U/L (13-69)   11/03/17  06:10    


 


Alkaline Phosphatase  49 U/L ()   11/03/17  06:10    


 


Creatine Kinase  35 U/L ()   11/02/17  07:10    


 


Troponin I  < 0.03 ng/mL (0.03-0.06)  L  11/02/17  07:10    


 


NT-Pro-B Natriuret Pep  1370.2 pg/mL (15.0-450.0)  H  11/02/17  07:10    


 


Total Protein  6.5 g/dL (6.3-8.2)   11/03/17  06:10    


 


Albumin  3.0 g/dL (3.5-5.0)  L  11/03/17  06:10    


 


Urine Color  Yellow  (YELLOW)   11/02/17  10:00    


 


Urine Appearance  Clear  (CLEAR)   11/02/17  10:00    


 


Urine pH  7.5  (5.0 - 8.0)   11/02/17  10:00    


 


Ur Specific Gravity  1.015  (1.010-1.030)   11/02/17  10:00    


 


Urine Protein  1+ mg/dL (NEGATIVE)  H  11/02/17  10:00    


 


Urine Ketones  Negative mg/dL (NEGATIVE)   11/02/17  10:00    


 


Urine Occult Blood  Negative  (NEGATIVE)   11/02/17  10:00    


 


Urine Nitrite  Negative  (NEGATIVE)   11/02/17  10:00    


 


Urine Bilirubin  Negative  (NEGATIVE)   11/02/17  10:00    


 


Urine Urobilinogen  1.0 Eu (0.2-1.0)   11/02/17  10:00    


 


Ur Leukocyte Esterase  Negative  (NEGATIVE)   11/02/17  10:00    


 


Urine RBC  0-2  (0-2 HPF)   11/02/17  10:00    


 


Urine WBC  0-2  (0-5 HPF)   11/02/17  10:00    


 


Ur Squamous Epith Cells  Few  (NEG-FEW)   11/02/17  10:00    


 


Urine Bacteria  Few  (NEGATIVE)  H  11/02/17  10:00    


 


Urine Glucose  Negative mg/dL (NEGATIVE)   11/02/17  10:00    

















Assessment/Plan





- Assessment/Plan


(1) CAP (community acquired pneumonia)


Status: Acute   Current Visit: Yes   


Qualifiers: 


   Laterality: right   Lung location: lower lobe of lung   Qualified Code(s): 

J18.1 - Lobar pneumonia, unspecified organism   


Plan: 


Improved.  Continue IV antibiotics and O2.  Try to wean O2 as tolerated.  








(2) CHF (congestive heart failure)


Status: Acute   Current Visit: Yes   


Qualifiers: 


   Congestive heart failure type: unspecified congestive heart failure type   

Congestive heart failure chronicity: acute on chronic   Qualified Code(s): 

I50.9 - Heart failure, unspecified   


Plan: 


IV lasix yesterday.  Good oral intake so no IVF.








(3) Anemia


Status: Acute   Current Visit: Yes   


Plan: 


No evidence of GI bleed.  Guaic stools.  No lovenox but will do SHARRON hose.

## 2017-11-04 VITALS
DIASTOLIC BLOOD PRESSURE: 61 MMHG | SYSTOLIC BLOOD PRESSURE: 125 MMHG | SYSTOLIC BLOOD PRESSURE: 125 MMHG | DIASTOLIC BLOOD PRESSURE: 61 MMHG

## 2017-11-04 LAB
BASOPHILS NFR BLD: 0.2 % (ref 0–1.5)
EGFR (AFRICAN): > 60
EGFR (NON-AFRICAN): 42
EOSINOPHIL NFR BLD: 1 % (ref 0–6.8)
IRON SERPL-MCNC: 27 UG/DL (ref 37–145)
MCH RBC QN AUTO: 28.2 PG (ref 28–34)
MCV RBC AUTO: 90 FL (ref 80–100)
MONOCYTES %: 2.1 % (ref 0–11)
NEUTROPHILS #: 6.9 # K/UL (ref 1.4–7.7)

## 2017-11-04 RX ADMIN — CITALOPRAM SCH MG: 20 TABLET ORAL at 08:48

## 2017-11-04 RX ADMIN — FUROSEMIDE SCH MG: 20 TABLET ORAL at 08:48

## 2017-11-04 RX ADMIN — IPRATROPIUM BROMIDE AND ALBUTEROL SULFATE SCH ML: .5; 3 SOLUTION RESPIRATORY (INHALATION) at 00:08

## 2017-11-04 RX ADMIN — SODIUM CHLORIDE SCH: 9 INJECTION, SOLUTION INTRAVENOUS at 07:44

## 2017-11-04 RX ADMIN — IPRATROPIUM BROMIDE AND ALBUTEROL SULFATE SCH ML: .5; 3 SOLUTION RESPIRATORY (INHALATION) at 06:00

## 2017-11-04 RX ADMIN — APIXABAN SCH MG: 2.5 TABLET, FILM COATED ORAL at 08:48

## 2017-11-04 RX ADMIN — GLIMEPIRIDE SCH MG: 2 TABLET ORAL at 07:52

## 2017-11-04 RX ADMIN — CARVEDILOL SCH MG: 6.25 TABLET, FILM COATED ORAL at 08:49

## 2017-11-04 RX ADMIN — ALLOPURINOL SCH MG: 100 TABLET ORAL at 08:48

## 2017-11-04 RX ADMIN — CEFTRIAXONE SODIUM SCH MLS/HR: 1 INJECTION, POWDER, FOR SOLUTION INTRAMUSCULAR; INTRAVENOUS at 07:42

## 2017-11-04 RX ADMIN — GABAPENTIN SCH MG: 300 CAPSULE ORAL at 08:48

## 2017-11-04 RX ADMIN — DOCUSATE SODIUM SCH MG: 100 CAPSULE, LIQUID FILLED ORAL at 08:49

## 2017-11-04 RX ADMIN — PANTOPRAZOLE SODIUM SCH MG: 40 TABLET, DELAYED RELEASE ORAL at 06:14

## 2017-11-04 NOTE — DISCHARGE SUMMARY
Discharge Summary





- Discharge Sumary


History of Present Illness: 





76yo white female who has not been feeling well over the last 2 weeks. Having 

some increase dyspnea.  Over the last three days has developed a productive 

cough of some purulent green/yellow phlegm. This AM began to run a fever, not 

sure how high. Patient states she has been having some wheezing and right 

anterior chest pain associated with coughing. Patient denies any hemoptysis. 

Patient states that her diabetes mellitus has been stable. and was seen in the 

ED. Patient found to have a RLL infiltrate and admitted for treatment of 

pneumonia. Blood sugars have been doing fairly well.


Condition at Discharge: Stable


Home Medications: 


 Ambulatory Orders











 Medication  Instructions  Recorded


 


Carvedilol [Coreg] 6.25 mg PO Q12 02/05/17


 


Citalopram Hydrobromide 20 mg PO DAILY 02/05/17





[Citalopram HBr]  


 


Docusate Sodium [Colace] 100 - 300 mg PO DAILY 02/13/17


 


[place]-3S/Dha/Epa/Fish Oil [Fish 1 each PO D 02/13/17





Oil 1,200 mg Softgel]  


 


Ergocalciferol (Vitamin D2) 400 unit PO D 02/15/17





[Vitamin D]  


 


Prednisone 5 mg PO AM 02/25/17


 


predniSONE [Deltasone] 2.5 mg PO PM 02/25/17


 


Furosemide [Lasix] 20 mg PO BID 05/08/17


 


Allopurinol [Zyloprim] 100 mg PO DAILY #30 05/10/17


 


Glimepiride [Amaryl] 1 PO [id number] 11/02/17


 


Prenatal Vit/Iron Fumarate/FA 1 PO [id number] 11/02/17





[Prenatal Tablet]  


 


Amoxicillin/Potassium Clav 1 each PO BID #14 tablet 11/04/17





[Augmentin 875-125 Tablet]  


 


Azithromycin [Zithromax] 250 mg PO DAILY #2 tablet 11/04/17


 


Ferrous Sulfate [Feosol] 325 mg PO DAILY #30 tablet 11/04/17











Consultations this Visit: None


Procedures this Visit: None


Allergies/Adverse Reactions: 


 Allergies











Allergy/AdvReac Type Severity Reaction Status Date / Time


 


Sulfa (Sulfonamide Allergy Unknown Generalized Verified 11/02/17 07:22





Antibiotics)   Swelling  











Patient Problems: 


 Current Active Problems











Problem Status Onset


 


Anemia Acute  


 


CAP (community acquired pneumonia) Acute  


 


CHF (congestive heart failure) Acute  


 


Fever Acute  


 


Weakness Acute  


 


Sjogren's syndrome Chronic  











Discharge Summary: 





Patient was admitted to [facility] with pneumonia and hypoxia.  She was treated with 

O2 and IV zithromax and rocephin.  After 2 days we were able to wean off O2.  

She responded very well.  Felt much better on discharge.  Her admission HGB was 

10 but dropped to 8.5-8.6 and stayed stable.  STool guiac x 1 was negative.  

Iron studies are pending and she will follow up with DR. Nieto on 11-8 to work 

this anemia up.  Colonoscopy a year ago was normal.  Has GERD but controlled on 

Dexilant.  BNP  mild elevated on admission so she was given lasix 20 mg x 1.  

Breathing greatly improved.


Hospital Course: Discharge Dx:  Pneumonia.  Hypoxia.  Anemia.  HTN.  

Disposition - home

## 2017-11-21 ENCOUNTER — HOSPITAL ENCOUNTER (OUTPATIENT)
Dept: HOSPITAL 44 - OUT | Age: 77
End: 2017-11-21
Attending: ANESTHESIOLOGY
Payer: MEDICARE

## 2017-11-21 DIAGNOSIS — M47.816: Primary | ICD-10-CM

## 2017-11-21 PROCEDURE — 64493 INJ PARAVERT F JNT L/S 1 LEV: CPT

## 2017-11-21 PROCEDURE — S1016 NON-PVC INTRAVENOUS ADMINIST: HCPCS

## 2017-11-21 PROCEDURE — 64494 INJ PARAVERT F JNT L/S 2 LEV: CPT

## 2017-11-21 PROCEDURE — 99214 OFFICE O/P EST MOD 30 MIN: CPT

## 2017-11-21 PROCEDURE — 64495 INJ PARAVERT F JNT L/S 3 LEV: CPT

## 2017-11-22 NOTE — LUMBAR FACET MBB
SUBJECTIVE:

Ms. Foy presents today with axial back pain for facet medial branch blocks.  
I had her approved for facet medial branch blocks for spondylolytic back pain 
and a positive assisted extension, extension rotation.   She no longer has 
right leg groin or groin pain at the L4 transforaminal injection.  At this point
, it is axial back pain.  I plan on a lumbar facet medial branch blocks with 
fluoroscopic guidance.  She is in a lot of discomfort and I am going to request 
monitored anesthesia care for the injection today and dispense a diary.  



Plan for L4, L5, and sacral ala facet medial branch blocks today under 
fluoroscopy. 



ANESTHESIA:

Monitored anesthesia care.



OPERATIVE PROCEDURE:

L4, L5, and sacral ala facet medial nerve branch blocks with fluoroscopic 
guidance. 



DESCRIPTION OF PROCEDURE:

Consent was obtained after the risks were fully explained including bleeding, 
infection, nerve damage, worsening of symptoms with no improvement. 



The patient was positioned prone on fluoroscopic procedure table and a sterile 
prep and drape were applied.  Under monitored anesthesia care with direct 
fluoroscopic imaging, the left L4 transverse process and superior articulating 
process were identified.  A 23-gauge, 3  inch spinal needle was advanced to 
the left L4 facet medial branch nerve.  Negative aspiration of blood of CSF was 
then obtained and following this, the medication was injected.   The stylet was 
replaced in the needle and the needle was subsequently removed from the back. 



This exact same procedure was then repeated at right L4, bilateral L5, and 
bilateral sacral ala for a total of 6 medial branch nerve blocks.



At this point, bilateral sacroiliac joint injections were placed at each 
sacroiliac joint.  



On completion of the procedures, the back was cleaned and a bandage was applied 
over the injection sites. The patient tolerated the procedure without  
complications. The patient was monitored for 20 minutes following the procedure 
during which time the patient experienced no adverse sequelae. The patient was 
discharged to home in good condition. 



ASSESSMENT:

Lumbar spondylosis.



PLAN: 

L4, L5, and sacral ala facet medial nerve branch blocks with fluoroscopic 
guidance today.  Then plan for follow up with radiofrequency.  We will dispense 
with a diary.  



FOLLOWUP:

Return to clinic if problems develop or worsen.







cc:   Dr. Raphael APARICIO

## 2018-01-04 ENCOUNTER — HOSPITAL ENCOUNTER (EMERGENCY)
Dept: HOSPITAL 44 - ED | Age: 78
Discharge: HOME | End: 2018-01-04
Payer: MEDICARE

## 2018-01-04 VITALS
DIASTOLIC BLOOD PRESSURE: 64 MMHG | SYSTOLIC BLOOD PRESSURE: 148 MMHG | SYSTOLIC BLOOD PRESSURE: 148 MMHG | DIASTOLIC BLOOD PRESSURE: 64 MMHG | SYSTOLIC BLOOD PRESSURE: 148 MMHG | DIASTOLIC BLOOD PRESSURE: 64 MMHG

## 2018-01-04 DIAGNOSIS — I35.0: ICD-10-CM

## 2018-01-04 DIAGNOSIS — R26.81: Primary | ICD-10-CM

## 2018-01-04 LAB
BASOPHILS NFR BLD: 0.4 % (ref 0–1.5)
EGFR (AFRICAN): > 60
EGFR (NON-AFRICAN): > 60
EOSINOPHIL NFR BLD: 2.1 % (ref 0–6.8)
MCH RBC QN AUTO: 30 PG (ref 28–34)
MCV RBC AUTO: 93.7 FL (ref 80–100)
MONOCYTES %: 5 % (ref 0–11)
NEUTROPHILS #: 3.9 # K/UL (ref 1.4–7.7)

## 2018-01-04 PROCEDURE — 82550 ASSAY OF CK (CPK): CPT

## 2018-01-04 PROCEDURE — 85025 COMPLETE CBC W/AUTO DIFF WBC: CPT

## 2018-01-04 PROCEDURE — 80053 COMPREHEN METABOLIC PANEL: CPT

## 2018-01-04 PROCEDURE — 73562 X-RAY EXAM OF KNEE 3: CPT

## 2018-01-04 PROCEDURE — 99283 EMERGENCY DEPT VISIT LOW MDM: CPT

## 2018-01-04 PROCEDURE — 81002 URINALYSIS NONAUTO W/O SCOPE: CPT

## 2018-01-04 PROCEDURE — 73560 X-RAY EXAM OF KNEE 1 OR 2: CPT

## 2018-01-04 PROCEDURE — 83880 ASSAY OF NATRIURETIC PEPTIDE: CPT

## 2018-01-04 PROCEDURE — 84484 ASSAY OF TROPONIN QUANT: CPT

## 2018-01-04 PROCEDURE — S1016 NON-PVC INTRAVENOUS ADMINIST: HCPCS

## 2018-01-04 PROCEDURE — 70450 CT HEAD/BRAIN W/O DYE: CPT

## 2018-01-04 PROCEDURE — 99284 EMERGENCY DEPT VISIT MOD MDM: CPT

## 2018-01-04 PROCEDURE — 85730 THROMBOPLASTIN TIME PARTIAL: CPT

## 2018-01-04 PROCEDURE — 84550 ASSAY OF BLOOD/URIC ACID: CPT

## 2018-01-04 PROCEDURE — 96360 HYDRATION IV INFUSION INIT: CPT

## 2018-01-04 PROCEDURE — 85610 PROTHROMBIN TIME: CPT

## 2018-01-04 NOTE — DIAGNOSTIC IMAGING REPORT
SAMIA FERNANDEZ~

 [facility] [place] [facility]

 [address] [facility] [address]

 [place], [place]. [address]

~

Report Submission Date: 2018 5:51:50 PM CST







Patient ~ Study  

 

Name: EVELIA JAIMES ~ Date: 2018 5:30:00 PM CST

 

MRN:  ~ Modality Type: CT\SR

 

Gender: F ~ Description: CT BRAIN W/O CONTRAST

 

: 36 ~ Institution: [facility] [place] [facility]

 

Physician: SAMIA FERNANDEZ

  ~ ~Accession: ~[id number]





~

Examination: CT head without contrast 



History:~Dizzy 



Comparison exam: None available for direct review. 



Technique: Noncontrast head CT protocol. 



Findings: Ventricles and sulci are consistent for patient age. 
Cerebrocerebellar parenchyma demonstrates periventricular low attenuation 
consistent with small vessel disease. No evidence for parenchymal hemorrhage. 
No evidence for mass or mass effect. No midline shift. No extra axial fluid 
collections. Partial visualization of the paranasal sinuses, mastoid air cells, 
orbits, skull and scalp without gross irregularity. Streak artifact from dental 
hardware. 



Impression: Age related changes. No acute parenchymal process. No hemorrhage.

~

Electronically signed on 2018 5:51:50 PM CST by:

Luis Antonio APARICIO

## 2018-01-04 NOTE — ED PHYSICIAN DOCUMENTATION
Fall





- HISTORIAN


Historian: patient





- HPI


Stated Complaint: fall


Chief Complaint: Fall


Onset: other (1 hour ago )


Where: home


Context: lost balance


Associated Symptoms:: brief (seconds).  denies: dazed, seizure, memory 

impairment


Location of Pain/Injury: head


Injury to Right Extremity: none


Injury to Left Extremity: none


Further Comments: yes (She reports for the last week she has been dizzy and she 

fell x 3 in last two days. She reports today going to the [place]ichen and getting 

her cheescake and just feeling like she was going to fall back and she did . 

She states she might have lost conciousness for a second but does not think she 

did. She remembers getting the cheescake back on the plate and crawling back to 

the couch. Her brother came and she got up to write him out some bills and did 

fall back on the couch when trying to get up to do this for her brother. She 

states she has some dizziness. Denies any weakness. She remembers the entire 

event. She has had some blurry vision from her right eye but this has been 

going on for a while. she reports a blood clot and she is on blood thinner she 

is "about to get off that medicine b/c it was 6 months ago" she does see a 

cardiologst but denies any chest pain)





- ROS


CONST: no problems


NEURO: dizziness


MS/SKIN/LYMPH: denies: weakness, numbness, neck pain, back pain


GI/: denies: nausea, vomiting





- PAST HX


Past History: cardiac disease


Immunizations: UTD


Allergies/Adverse Reactions: 


 Allergies











Allergy/AdvReac Type Severity Reaction Status Date / Time


 


Sulfa (Sulfonamide Allergy Unknown Generalized Verified 01/04/18 17:28





Antibiotics)   Swelling  














Home Medications: 


 Ambulatory Orders











 Medication  Instructions  Recorded


 


Carvedilol [Coreg] 6.25 mg PO Q12 02/05/17


 


Citalopram Hydrobromide 20 mg PO DAILY 02/05/17





[Citalopram HBr]  


 


Docusate Sodium [Colace] 100 - 300 mg PO PRN PRN 02/13/17


 


Ergocalciferol (Vitamin D2) 400 unit PO D 02/15/17





[Vitamin D]  


 


Allopurinol [Zyloprim] 100 mg PO DAILY #30 05/10/17


 


Glimepiride [Amaryl] 1 tab PO [id number] 11/02/17


 


Prenatal Vit/Iron Fumarate/FA 1 tab PO [id number] 11/02/17





[Prenatal Tablet]  














- SOCIAL HX


Smoking History: non-smoker


Alcohol Use: none


Drug Use: none





- FAMILY HX


Family History: none





- VITAL SIGNS


Vital Signs: 





 Vital Signs











Temp Pulse Resp BP Pulse Ox


 


          125/61    


 


          11/04/17 10:00   














- REVIEWED ASSESSMENTS


Nursing Assessment  Reviewed: Yes


Vitals Reviewed: Yes





Progress





- Progress


Progress: 





1815: Discussed findings with patient and daughter. She is complaining of right 

knee pain and right foot second toe pain (she does have a history of gout and 

she is taking her medications) She did hit her knee on one of the fall 

episodes. She does report dizziness from flexiril. DG 





ED Results Lab/Radiology





- Radiology


Radiology Impressions: 





Examination: CT head without contrast 





History: Dizzy 





Comparison exam: None available for direct review. 





Technique: Noncontrast head CT protocol. 





Findings: Ventricles and sulci are consistent for patient age. 

Cerebrocerebellar parenchyma demonstrates periventricular low attenuation 

consistent with small vessel disease. No evidence for parenchymal hemorrhage. 

No evidence for mass or mass effect. No midline shift. No extra axial fluid 

collections. Partial visualization of the paranasal sinuses, mastoid air cells, 

orbits, skull and scalp without gross irregularity. Streak artifact from dental 

hardware. 





Impression: Age related changes. No acute parenchymal process. No hemorrhage. 


Electronically signed on Jan 4, 2018 5:51:50 PM CST by: 


Luis Antonio Mccarthy





Right knee 2 views 





Date of Exam: January 4, 2018. 





History: RT KNEE, PAIN/ CONTUSION TO MEDIAL RT KNEE AFTER FALL TODAY (Hx) / 

PAIN AFTER FALL (DICOM Hx) / PAIN AFTER FALL (Pt comments) 





Findings: 


No comparison studies are provided. There is a questionable fracture of the 

posterior distal right femur posteriorly just superior to the prosthesis. A 4 

view study of the right knee with both obliques is recommended. A right knee 

prosthesis is present and anatomic alignment. There is no evidence of 

loosening. The patella is in appropriate relationship with the distal femur. No 

dislocation is identified. Vascular atherosclerotic calcifications are present. 








Impression: 





Questionable fracture of the posterior distal femur. Recommend 4 view study of 

the right knee with both obliques. 


Electronically signed on Jan 4, 2018 6:25:59 PM CST by: 


Chris Ireland





Fall Physical Exam





- Physical Exam


General Appearance: no acute distress, alert


Head: non-tender, no swelling, no obvious injury


Neck: non-tender


Eye: EDMUNDO


Resp/CVS: chest non-tender, no ecchymosis, breath sounds nml, no resp. distress

, heart sounds nml


Abdomen: soft, normal bowel sounds


Neuro: oriented x3, CN's nml as tested, sensation nml, motor nml, mood/affect 

nml,  nml, reflexes nml


Skin: color nml, no rash


Back: normal inspection, no CVA tenderness


Extremities: atraumatic


Joint: joints nml, nml ROM





- [place] Coma Score


Eyes Open: Spontaneous


Speech: Oriented


Motor: Obeys Commands





Discharge


Clincal Impression: 


 Unsteady gait





Aortic stenosis


Qualifiers:


 Cardiac valve disease etiology: etiology unspecified Qualified Code(s): I35.0 

- Nonrheumatic aortic (valve) stenosis





Fall


Qualifiers:


 Encounter type: initial encounter Qualified Code(s): W19.XXXA - Unspecified 

fall, initial encounter





Referrals: 


Raphael Nieto MD [Primary Care Provider] - 2 Days


Comments: 





Stop Flexeril 


Baclofen 20 mg every 8 hours as needed for muscle pain 


Ibuprofen as needed for pain 


Rest 


Move positions slowly 


call Dr Albarran in am about episode


See Dr Nieto in a 2 days for follow up 


return to ER for concerning symptoms 


Condition: Stable


Disposition: 01 HOME, SELF-CARE


Decision to Admit: NO


Date of Decison to Admit: 01/04/18


Decision Time: 18:24

## 2018-01-05 LAB
APPEARANCE UR: CLEAR
COLOR,URINE: YELLOW
PH UR STRIP: 5.5 [PH] (ref 5–8)
RBC UR QL: (no result)
UROBILINOGEN URINE: 0.2 EU (ref 0.2–1)

## 2018-01-05 NOTE — DIAGNOSTIC IMAGING REPORT
SAMIA FERNANDEZ DELLUV[facility]

[address] [facility] [address] [place] [address]. [address]

 

 

 

 

Report Submission Date: 2018 6:25:59 PM CST

Patient       Study

Name:   EVELIA JAIMES       Date:   2018 6:11:23 PM CST

MRN:          Modality Type:   CR

Gender:   F       Description:   LOWER EXTREMITY

:   36       Institution:   [facility]

Physician:   JIM MARISEL [facility]

     Accession:    [id number]

 

 

Right knee 2 views 



Date of Exam: 2018. 



History:  RT KNEE, PAIN/ CONTUSION TO MEDIAL RT KNEE AFTER FALL TODAY (Hx) / 
PAIN AFTER FALL (DICOM Hx) / PAIN AFTER FALL (Pt comments) 



Findings: 

No comparison studies are provided. There is a questionable fracture of the 
posterior distal right femur posteriorly just superior to the prosthesis. A 4 
view study of the right knee with both obliques is recommended. A right knee 
prosthesis is present and anatomic alignment. There is no evidence of 
loosening. The patella is in appropriate relationship with the distal femur. No 
dislocation is identified. Vascular atherosclerotic calcifications are present. 





Impression: 



Questionable fracture of the posterior distal femur. Recommend 4 view study of 
the right knee with both obliques.

 

Electronically signed on 2018 6:25:59 PM CST by:

SAMIA uMnoz  DEVI 

[facility]

[address] [facility] [address] [address]

 

 

 

 

Report Submission Date: 2018 7:20:52 PM CST

Patient       Study

Name:   EVELIA JAIMES       Date:   2018 6:46:07 PM CST

MRN:          Modality Type:   CR

Gender:   F       Description:   LOWER EXTREMITY

:   36       Institution:   [facility]

Physician:   FERNANDEZSAMIA COLEMAN [facility]

     Accession:    [id number]

 

 

Right knee oblique views 



Date of Exam: 2018. 



History:  RT KNEE, PAIN AFTER FALL, ADDITIONAL OBLIQUE VIEWS REQUESTED (Hx) / 
PAIN, CONTUSION, POSSIBLE FX (DICOM Hx) / PAIN, CONTUSION, POSSIBLE FX (Pt 
comments) 



Findings: 

No acute fracture is identified. 





Impression: 



No evidence of acute fracture on oblique views.

 

Electronically signed on 2018 7:20:52 PM CST by:

Chris Ireland
[facility]

## 2018-01-11 ENCOUNTER — HOSPITAL ENCOUNTER (OUTPATIENT)
Dept: HOSPITAL 44 - RAD | Age: 78
End: 2018-01-11
Attending: FAMILY MEDICINE
Payer: MEDICARE

## 2018-01-11 DIAGNOSIS — M25.551: Primary | ICD-10-CM

## 2018-01-11 PROCEDURE — 73502 X-RAY EXAM HIP UNI 2-3 VIEWS: CPT

## 2018-01-11 NOTE — DIAGNOSTIC IMAGING REPORT
KENNA VILLANUEVA 

[facility]

[address] [facility] [address]. [address]

 

 

 

 

Report Submission Date: 2018 1:06:45 PM CST

Patient       Study

Name:   EVELIA JAIMES       Date:   2018 12:36:56 PM CST

MRN:          Modality Type:   CR

Gender:   F       Description:   PELVIS

:   36       Institution:   [facility]

Physician:   KENNA VILLANUEVA

     Accession:    [id number]

 

 

Examination: Plain film hip/pelvis 



History: Hip discomfort 



Comparison exams: None provided 



Findings: 3 views of the hip demonstrates a prosthetic device in place. No 
fracture.  No dislocation. Adjacent dystrophic calcifications. Superior and 
inferior pubic rami appear to be intact. Iliac wing without gross abnormality. 
Left hip articular degenerative changes. No soft tissue abnormality. 



Impression: Right hip replacement. No acute appearing osseous abnormality.

 

Electronically signed on 2018 1:06:45 PM CST by:

Luis Antonio APARICIO

## 2018-01-18 ENCOUNTER — HOSPITAL ENCOUNTER (OUTPATIENT)
Dept: HOSPITAL 44 - LAB | Age: 78
End: 2018-01-18
Attending: FAMILY MEDICINE
Payer: MEDICARE

## 2018-01-18 DIAGNOSIS — R82.99: Primary | ICD-10-CM

## 2018-01-18 LAB
PH UR STRIP: 6.5 [PH] (ref 5–8)
UROBILINOGEN URINE: 0.2 EU (ref 0.2–1)

## 2018-01-18 PROCEDURE — 81002 URINALYSIS NONAUTO W/O SCOPE: CPT

## 2018-03-06 ENCOUNTER — HOSPITAL ENCOUNTER (OUTPATIENT)
Dept: HOSPITAL 44 - ED | Age: 78
Setting detail: OBSERVATION
LOS: 1 days | Discharge: HOME | End: 2018-03-07
Attending: FAMILY MEDICINE | Admitting: FAMILY MEDICINE
Payer: MEDICARE

## 2018-03-06 VITALS — BODY MASS INDEX: 27.8 KG/M2

## 2018-03-06 DIAGNOSIS — T14.8XXA: Primary | ICD-10-CM
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
## 2018-03-06 DIAGNOSIS — Z23: ICD-10-CM
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
## 2018-03-06 DIAGNOSIS — E11.9: ICD-10-CM

## 2018-03-06 DIAGNOSIS — R51: ICD-10-CM
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
## 2018-03-06 PROCEDURE — 97165 OT EVAL LOW COMPLEX 30 MIN: CPT

## 2018-03-06 PROCEDURE — G0378 HOSPITAL OBSERVATION PER HR: HCPCS

## 2018-03-06 PROCEDURE — 72170 X-RAY EXAM OF PELVIS: CPT

## 2018-03-06 PROCEDURE — 70450 CT HEAD/BRAIN W/O DYE: CPT

## 2018-03-06 PROCEDURE — 96372 THER/PROPH/DIAG INJ SC/IM: CPT

## 2018-03-06 PROCEDURE — 90471 IMMUNIZATION ADMIN: CPT

## 2018-03-06 PROCEDURE — 85025 COMPLETE CBC W/AUTO DIFF WBC: CPT

## 2018-03-06 PROCEDURE — 36415 COLL VENOUS BLD VENIPUNCTURE: CPT

## 2018-03-06 PROCEDURE — 80053 COMPREHEN METABOLIC PANEL: CPT

## 2018-03-06 PROCEDURE — 71100 X-RAY EXAM RIBS UNI 2 VIEWS: CPT

## 2018-03-06 PROCEDURE — 73110 X-RAY EXAM OF WRIST: CPT

## 2018-03-06 PROCEDURE — 99218: CPT

## 2018-03-06 PROCEDURE — 99284 EMERGENCY DEPT VISIT MOD MDM: CPT

## 2018-03-06 PROCEDURE — 90715 TDAP VACCINE 7 YRS/> IM: CPT

## 2018-03-06 PROCEDURE — 99217: CPT

## 2018-03-06 PROCEDURE — 72125 CT NECK SPINE W/O DYE: CPT

## 2018-03-06 RX ADMIN — FERROUS SULFATE TAB EC 325 MG (65 MG FE EQUIVALENT) SCH: 325 (65 FE) TABLET DELAYED RESPONSE at 17:59

## 2018-03-06 RX ADMIN — APIXABAN SCH MG: 2.5 TABLET, FILM COATED ORAL at 17:48

## 2018-03-06 RX ADMIN — FERROUS SULFATE TAB EC 325 MG (65 MG FE EQUIVALENT) SCH MG: 325 (65 FE) TABLET DELAYED RESPONSE at 17:47

## 2018-03-06 RX ADMIN — SIMVASTATIN SCH MG: 40 TABLET, FILM COATED ORAL at 17:48

## 2018-03-06 RX ADMIN — CARVEDILOL SCH MG: 6.25 TABLET, FILM COATED ORAL at 20:44

## 2018-03-06 RX ADMIN — APIXABAN SCH: 2.5 TABLET, FILM COATED ORAL at 17:59

## 2018-03-06 RX ADMIN — GLIMEPIRIDE SCH MG: 2 TABLET ORAL at 17:47

## 2018-03-06 RX ADMIN — SIMVASTATIN SCH: 40 TABLET, FILM COATED ORAL at 17:59

## 2018-03-06 NOTE — DIAGNOSTIC IMAGING REPORT
KENNA VILLANUEVA 

[facility]

[address] [facility] [address]

[place], [place]. [address]

 

 

 

 

Report Submission Date: Mar 6, 2018 3:17:07 PM CST

Patient       Study

Name:   EVELIA JAIMES W       Date:   Mar 6, 2018 2:41:40 PM CST

MRN:   [id number]       Modality Type:   CT\SR

Gender:   F       Description:   CT BRAIN W/O CONTRAST

:   36       Institution:   [facility]

Physician:   KENNA VILLANUEVA

     Accession:    [id number]

 

 

Examination: CT head without contrast 



History: PT FELL ON RIGHT SIDE OF HEAD X 2 HRS AGO (Hx) 



Comparison exam: None available 



Technique: Noncontrast head CT protocol. 



Findings: Ventricles and sulci are prominent, though consistent for patient 
age. Cerebrocerebellar parenchyma demonstrates periventricular low attenuation 
consistent with small vessel disease. No evidence for parenchymal hemorrhage. 
No evidence for mass or mass effect. No midline shift. No extra axial fluid 
collections. Partial visualization of the paranasal sinuses, mastoid air cells, 
orbits, skull and scalp without gross irregularity. Streak artifact from dental 
hardware. 



Impression: Age related changes. No acute parenchymal process. No hemorrhage.

 

Electronically signed on Mar 6, 2018 3:17:07 PM CST by:

Luis Antonio APARICIO

## 2018-03-06 NOTE — DIAGNOSTIC IMAGING REPORT
KENNA VILLANUEVA 

[facility]

[address] [facility] [address] [address]

 

 

 

 

Report Submission Date: Mar 6, 2018 3:49:50 PM CST

Patient       Study

Name:   EVELIA JAIMES       Date:   Mar 6, 2018 3:31:17 PM CST

MRN:          Modality Type:   DX

Gender:   F       Description:   PELVIS

:   36       Institution:   [facility]

Physician:   KENNA VILLANUEVA

     Accession:    [id number]

 

 

Examination: Plain film pelvis/ left hip 



History: PAIN AFTER FALL (Hx) 



Comparison exams: None provided 



Findings: 3 views of the pelvis and left hip demonstrates articular 
degenerative spurring. Right hip replacement. No fracture no dislocation. 
Lumbar degenerative changes. No soft tissue abnormality. 



Impression: Degenerative changes. No fracture or dislocation.

 

Electronically signed on Mar 6, 2018 3:49:50 PM CST by:

Luis Antonio APARICIO
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
## 2018-03-06 NOTE — DIAGNOSTIC IMAGING REPORT
KENNA VILLANUEVA 

[facility]

[address] [facility] [address] [place] [address]. [address]

 

 

 

 

Report Submission Date: Mar 6, 2018 3:22:02 PM CST

Patient       Study

Name:   EVELIA JAIMES W       Date:   Mar 6, 2018 2:44:48 PM CST

MRN:   [id number]       Modality Type:   CT\SR

Gender:   F       Description:   CT C-SPINE W/O

:   36       Institution:   [facility]

Physician:   KENNA VILLANUEVA

     Accession:    [id number]

 

 

Examination: CT cervical spine 



History: PT FELL X 2 HRS AGO, NECK PAIN (Hx) 



Comparison exams: None provided 



Technique: CT cervical spine axial imaging with sagittal and coronal 
reconstruction 



Findings: Sagittal reconstruction demonstrates normal height and alignment the 
cervical vertebral bodies. No anterior compression deformity. Anterior, lateral
, and posterior osteophytes.   Coronal reconstruction does not demonstrate 
locked or perched facets. Atlantoaxial degenerative changes. Carotid vascular 
calcifications. 



Axial imaging obtained from the skull base through T1 



Lamina and pedicles are intact.   No ossific density within the central canal. 
Multilevel facet degenerative changes. No prevertebral soft tissue abnormality. 



Impression: Multilevel degenerative changes.   No evidence for vertebral body 
compression fracture.

 

Electronically signed on Mar 6, 2018 3:22:02 PM CST by:

Luis Antonio APARICIO

## 2018-03-07 VITALS — DIASTOLIC BLOOD PRESSURE: 69 MMHG | SYSTOLIC BLOOD PRESSURE: 133 MMHG

## 2018-03-07 LAB
BASOPHILS NFR BLD: 0.4 % (ref 0–1.5)
EGFR (AFRICAN): > 60
EGFR (NON-AFRICAN): 38
EOSINOPHIL NFR BLD: 1.8 % (ref 0–6.8)
MCH RBC QN AUTO: 30 PG (ref 28–34)
MCV RBC AUTO: 92 FL (ref 80–100)
MONOCYTES %: 4.9 % (ref 0–11)
NEUTROPHILS #: 3.8 # K/UL (ref 1.4–7.7)

## 2018-03-07 RX ADMIN — APIXABAN SCH MG: 2.5 TABLET, FILM COATED ORAL at 09:09

## 2018-03-07 RX ADMIN — CARVEDILOL SCH MG: 6.25 TABLET, FILM COATED ORAL at 09:09

## 2018-03-07 RX ADMIN — FERROUS SULFATE TAB EC 325 MG (65 MG FE EQUIVALENT) SCH MG: 325 (65 FE) TABLET DELAYED RESPONSE at 10:54

## 2018-03-07 RX ADMIN — GLIMEPIRIDE SCH MG: 2 TABLET ORAL at 07:27

## 2018-03-07 NOTE — DIAGNOSTIC IMAGING REPORT
[facility] 

[facility]

[address] [facility] [address] [address]

 

 

 

 

Report Submission Date: Mar 7, 2018 1:23:55 PM CST

Patient       Study

Name:   EVELIA JAIMES       Date:   Mar 7, 2018 12:52:15 PM CST

MRN:          Modality Type:   DX

Gender:   F       Description:   UPPER EXTREMITY

:   36       Institution:   [facility]

Physician:   [facility]

     Accession:    [id number]

 

 

Examination: Plain film right wrist 



History: RT WRIST, PAIN IN RT WRIST AFTER FALL YESTERDAY (Hx) 



Comparison exams: None available 



Findings: 3 views the right wrist demonstrates diffuse osteopenia. Extensive 
articular degenerative changes. No fracture.  No dislocation. No soft tissue 
abnormality. 



Impression: Osteopenia and degenerative changes. No acute osseous abnormality

 

Electronically signed on Mar 7, 2018 1:23:55 PM CST by:

Luis Antonio APARICIO

## 2018-03-07 NOTE — DIAGNOSTIC IMAGING REPORT
[facility] 

[facility]

[address] [facility] [address] [address]

 

 

 

 

Report Submission Date: Mar 7, 2018 1:21:48 PM CST

Patient       Study

Name:   EVELIA JAIMES       Date:   Mar 7, 2018 12:59:20 PM CST

MRN:          Modality Type:   DX

Gender:   F       Description:   CHEST

:   36       Institution:   [facility]

Physician:   [facility]

     Accession:    [id number]

 

 

Examination: Plain film right ribs 



History: RT RIBS, PAIN IN RT RIBS AFTER FALL YESTERDAY (Hx) 



Findings: 3 views of the right ribs demonstrates normal cortical margins. No 
fracture or dislocation. Underlying parenchymal without abnormality. Humeral 
replacement. Acromioclavicular joint degenerative changes. 



Impression: No rib fracture/abnormality.

 

Electronically signed on Mar 7, 2018 1:21:48 PM CST by:

Luis Antonio APARICIO

## 2018-04-01 ENCOUNTER — HOSPITAL ENCOUNTER (EMERGENCY)
Dept: HOSPITAL 44 - ED | Age: 78
Discharge: HOME | End: 2018-04-01
Payer: MEDICARE

## 2018-04-01 VITALS
DIASTOLIC BLOOD PRESSURE: 60 MMHG | DIASTOLIC BLOOD PRESSURE: 60 MMHG | SYSTOLIC BLOOD PRESSURE: 121 MMHG | SYSTOLIC BLOOD PRESSURE: 121 MMHG | DIASTOLIC BLOOD PRESSURE: 60 MMHG | DIASTOLIC BLOOD PRESSURE: 60 MMHG | DIASTOLIC BLOOD PRESSURE: 60 MMHG | SYSTOLIC BLOOD PRESSURE: 121 MMHG | DIASTOLIC BLOOD PRESSURE: 60 MMHG | SYSTOLIC BLOOD PRESSURE: 121 MMHG | DIASTOLIC BLOOD PRESSURE: 60 MMHG | SYSTOLIC BLOOD PRESSURE: 121 MMHG | DIASTOLIC BLOOD PRESSURE: 60 MMHG | SYSTOLIC BLOOD PRESSURE: 121 MMHG | DIASTOLIC BLOOD PRESSURE: 60 MMHG | SYSTOLIC BLOOD PRESSURE: 121 MMHG | SYSTOLIC BLOOD PRESSURE: 121 MMHG | DIASTOLIC BLOOD PRESSURE: 60 MMHG | SYSTOLIC BLOOD PRESSURE: 121 MMHG | DIASTOLIC BLOOD PRESSURE: 60 MMHG | DIASTOLIC BLOOD PRESSURE: 60 MMHG | SYSTOLIC BLOOD PRESSURE: 121 MMHG | SYSTOLIC BLOOD PRESSURE: 121 MMHG | SYSTOLIC BLOOD PRESSURE: 121 MMHG | DIASTOLIC BLOOD PRESSURE: 60 MMHG | SYSTOLIC BLOOD PRESSURE: 121 MMHG | DIASTOLIC BLOOD PRESSURE: 60 MMHG | SYSTOLIC BLOOD PRESSURE: 121 MMHG

## 2018-04-01 DIAGNOSIS — I50.9: ICD-10-CM

## 2018-04-01 DIAGNOSIS — E86.0: ICD-10-CM

## 2018-04-01 DIAGNOSIS — S09.90XA: Primary | ICD-10-CM

## 2018-04-01 LAB
BASOPHILS NFR BLD: 0.2 % (ref 0–1.5)
EGFR (AFRICAN): > 60
EGFR (NON-AFRICAN): 38
EOSINOPHIL NFR BLD: 1 % (ref 0–6.8)
MCH RBC QN AUTO: 29.6 PG (ref 28–34)
MCV RBC AUTO: 93.5 FL (ref 80–100)
MONOCYTES %: 3.7 % (ref 0–11)
NEUTROPHILS #: 4.2 # K/UL (ref 1.4–7.7)

## 2018-04-01 PROCEDURE — 80053 COMPREHEN METABOLIC PANEL: CPT

## 2018-04-01 PROCEDURE — 82550 ASSAY OF CK (CPK): CPT

## 2018-04-01 PROCEDURE — S1016 NON-PVC INTRAVENOUS ADMINIST: HCPCS

## 2018-04-01 PROCEDURE — 84484 ASSAY OF TROPONIN QUANT: CPT

## 2018-04-01 PROCEDURE — 70450 CT HEAD/BRAIN W/O DYE: CPT

## 2018-04-01 PROCEDURE — 83880 ASSAY OF NATRIURETIC PEPTIDE: CPT

## 2018-04-01 PROCEDURE — 99283 EMERGENCY DEPT VISIT LOW MDM: CPT

## 2018-04-01 PROCEDURE — 96360 HYDRATION IV INFUSION INIT: CPT

## 2018-04-01 PROCEDURE — 85025 COMPLETE CBC W/AUTO DIFF WBC: CPT

## 2018-04-01 PROCEDURE — 93005 ELECTROCARDIOGRAM TRACING: CPT

## 2018-04-01 PROCEDURE — 73502 X-RAY EXAM HIP UNI 2-3 VIEWS: CPT

## 2018-04-01 NOTE — DIAGNOSTIC IMAGING REPORT
MARI GALINDO 

[facility]

[address] [facility] [address] [address]

 

 

 

 

Report Submission Date: 2018 2:19:01 PM CDT

Patient       Study

Name:   EVELIA JAIMES       Date:   2018 1:49:34 PM CDT

MRN:   [id number]       Modality Type:   DX

Gender:   F       Description:   PELVIS

:   36       Institution:   [facility]

Physician:   MARI GALINDO

     Accession:    [id number]

 

 

Examination: Plain film left hip 



History: LEFT HIP PAIN AFTER FALL TODAY (Hx) 



Comparison exams: None provided 



Findings: 2 views of the left hip demonstrates articular degenerative spurring. 
Mild osteopenia. No fracture no dislocation. Soft tissue calcifications. No 
other soft tissue abnormality. 



Impression: Degenerative changes and osteopenia. No acute appearing osseous 
abnormality.

 

Electronically signed on 2018 2:19:01 PM CDT by:

Luis Antonio APARICIO

## 2018-04-01 NOTE — DIAGNOSTIC IMAGING REPORT
MARI GALINDO 

[facility]

[address] [facility] [address]

[place], [place]. [address]

 

 

 

 

Report Submission Date: 2018 2:23:22 PM CDT

Patient       Study

Name:   EVELIA JAIMES       Date:   2018 2:01:09 PM CDT

MRN:   [id number]       Modality Type:   CT\SR

Gender:   F       Description:   CT BRAIN W/O CONTRAST

:   36       Institution:   [facility]

Physician:   MARI GALINDO

     Accession:    [id number]

 

 

Examination: CT head without contrast 



History: FELL TODAY AND HIT HEAD ON TABLE (Hx) 



Comparison exam: 2018 



Technique: Noncontrast head CT protocol. 



Findings: Ventricles and sulci are prominent, though consistent for patient age
: stable to prior exam. Cerebrocerebellar parenchyma demonstrates 
periventricular low attenuation consistent with small vessel disease: also 
stable. No evidence for parenchymal hemorrhage. No evidence for mass or mass 
effect. No midline shift. No extra axial fluid collections. Partial 
visualization of the paranasal sinuses, mastoid air cells, orbits, skull and 
scalp without gross irregularity. Streak artifact from dental hardware. 



Impression: Stable age related changes. No acute parenchymal process. No 
hemorrhage.

 

Electronically signed on 2018 2:23:22 PM CDT by:

Luis Antonio APARICIO

## 2018-04-01 NOTE — ED PHYSICIAN DOCUMENTATION
General Adult





- HISTORIAN


Historian: patient





- HPI


Stated Complaint: fall


Chief Complaint: General Adult


Onset: hours


Timing: still present


Severity: moderate


Further Comments: yes (Pt is an 77 yo female who fell at [facility] earlier today 

and struck the R side of her head.  Pt is on Eliquis.  Pt also has L hip pain.  

Pt has fallen 4 times in the past 2 months, daughter says.  She was admitted to 

[facility] for contusions and unsteady gait after a fall earlier this month.  Pt does 

not know what made her fall today.  She evidently twisted, nearly turning 

around as she fell, as she struck the R side of her head on a table that had 

been on her L side as she faced forward.  This may account for her L hip pain.)





- ROS


CONST: no problems


EYES/ENT: none


CVS/RESP: none


GI/: none


MS/SKIN/LYMPH: other (hematoma, R side of head)


NEURO/PSYCH: headache (mild)





- PAST HX


Past History: other (DMII, Neuorpathy, HTN, CHF, HLD, OA, Sjorgrens Syndrome, 

depression/anxiety, kidney stone, gout.)


Surgeries/Procedures: other (b/l knee replacement, cholecystectomy, appendectomy

, R hip replacement, R shoulder surgery, heart valve replacement, back surgery, 

bladder repair, catarac removal, hx aortic stenosis.)


Allergies/Adverse Reactions: 


 Allergies











Allergy/AdvReac Type Severity Reaction Status Date / Time


 


Sulfa (Sulfonamide Allergy Unknown Generalized Verified 04/01/18 13:39





Antibiotics)   Swelling  














Home Medications: 


 Ambulatory Orders











 Medication  Instructions  Recorded


 


Carvedilol [Coreg] 6.25 mg PO Q12 02/05/17


 


Ergocalciferol (Vitamin D2) 400 unit PO D 02/15/17





[Vitamin D]  


 


Dexlansoprazole [Dexilant] 60 mg PO DAILY 03/06/18














- SOCIAL HX


Smoking History: non-smoker





- FAMILY HX


Family History: Yes (Father: lung cancer; Brother death from MI.)





- VITAL SIGNS


Vital Signs: 





 Vital Signs











Temp Pulse Resp BP Pulse Ox


 


          133/69    


 


          03/07/18 15:13   














- REVIEWED ASSESSMENTS


Nursing Assessment  Reviewed: Yes


Vitals Reviewed: Yes





Progress





- Progress


Progress: 





CT head w/o contrast: Ventricles and sulci are prominent, though consistent for 

patient age: stable to prior exam. Cerebrocerebellar parenchyma demonstrates 

periventricular low attenuation consistent with small vessel disease: also 

stable. No evidence for parenchymal hemorrhage. No evidence for mass or mass 

effect. No midline shift. No extra axial fluid collections. Partial 

visualization of the paranasal sinuses, mastoid air cells, orbits, skull and 

scalp without gross irregularity. Streak artifact from dental hardware.


Impression: Stable age related changes. No acute parenchymal process. No 

hemorrhage.








x-ray L hip: Degenerative changes and osteopenia. No acute appearing osseous 

abnormality.








 cc IVF





Tylenol 650 mg po x 1 for minor headache.





- EKG/XRAY/CT


EKG: NSR (HR=73; borderline LAD; non-specific T wave changes; artifact.)





General Adult Physical Exam





- PHYSICAL EXAM


GENERAL APPEARANCE: mild distress


EENT: eye inspection normal, pharynx normal, TM's nml


NECK: normal inspection, supple


RESPIRATORY: no resp distress, chest non-tender, breath sounds normal


CVS: reg rate & rhythm, heart sounds normal


ABDOMEN: soft, no organomegaly, normal bowel sounds


BACK: normal inspection, no CVA tenderness


SKIN: other (hematoma R parietal scalp; slight facial ecchymosis R cheek.)


EXTREMITIES: other (L hip tenderness; no rotation/shortening)


NEURO: oriented X3, CN's nml as tested, motor nml, sensation nml





Discharge


Clincal Impression: 


 fall, head trauma, dehydration





CHF (congestive heart failure)


Qualifiers:


 Heart failure type: unspecified Heart failure chronicity: chronic Qualified 

Code(s): I50.9 - Heart failure, unspecified





Referrals: 


Raphael Nieto MD [Primary Care Provider] - 


Condition: Stable


Disposition: 01 HOME, SELF-CARE


Decision to Admit: NO


Decision Time: 15:52

## 2018-04-10 ENCOUNTER — HOSPITAL ENCOUNTER (OUTPATIENT)
Dept: HOSPITAL 44 - CARD | Age: 78
End: 2018-04-10
Attending: INTERNAL MEDICINE
Payer: MEDICARE

## 2018-04-10 DIAGNOSIS — E78.5: ICD-10-CM

## 2018-04-10 DIAGNOSIS — Z95.2: Primary | ICD-10-CM

## 2018-04-10 DIAGNOSIS — E11.9: ICD-10-CM

## 2018-04-10 DIAGNOSIS — Z86.79: ICD-10-CM

## 2018-04-10 DIAGNOSIS — I82.409: ICD-10-CM

## 2018-04-25 ENCOUNTER — HOSPITAL ENCOUNTER (OUTPATIENT)
Dept: HOSPITAL 44 - RAD | Age: 78
End: 2018-04-25
Attending: FAMILY MEDICINE
Payer: MEDICARE

## 2018-04-25 DIAGNOSIS — M25.561: Primary | ICD-10-CM

## 2018-04-25 PROCEDURE — 73562 X-RAY EXAM OF KNEE 3: CPT

## 2018-04-25 NOTE — DIAGNOSTIC IMAGING REPORT
KENNA VILLANUEVA 

[facility]

[address] [facility].[address] [address]

 

 

 

 

Report Submission Date: 2018 2:45:46 PM CDT

Patient       Study

Name:   EVELIA JAIMES       Date:   2018 2:09:00 PM CDT

MRN:   [id number]       Modality Type:   DX

Gender:   F       Description:   LOWER EXTREMITY

:   36       Institution:   [facility]

Physician:   KENNA VILLANUEVA

     Accession:    [id number]

 

 

Examination: Plain film right knee 



History: PT C/O MEDIAL RIGHT KNEE PAIN SINCE SHE FELL 3 WEEKS AGO. PT STATES 
THAT SHE HAD A KNEE REPLACEMENT 22 YEARS AGO. (Hx) 



Findings: 3 views of the right knee demonstrates knee replacement in place. 
Osteopenia.  No fracture.  No dislocation. No joint effusion. Posterior 
vascular calcifications. 



Impression: Knee replacement.  Osteopenia. No acute process.

 

Electronically signed on 2018 2:45:46 PM CDT by:

Luis Antonio APARICIO

## 2018-05-11 ENCOUNTER — HOSPITAL ENCOUNTER (OUTPATIENT)
Dept: HOSPITAL 44 - LAB | Age: 78
End: 2018-05-11
Attending: FAMILY MEDICINE
Payer: MEDICARE

## 2018-05-11 DIAGNOSIS — E78.5: Primary | ICD-10-CM

## 2018-05-11 DIAGNOSIS — E79.0: ICD-10-CM

## 2018-05-11 DIAGNOSIS — N18.3: ICD-10-CM

## 2018-05-11 DIAGNOSIS — E11.9: ICD-10-CM

## 2018-05-11 LAB
EGFR (AFRICAN): > 60
EGFR (NON-AFRICAN): 38

## 2018-05-11 PROCEDURE — 36415 COLL VENOUS BLD VENIPUNCTURE: CPT

## 2018-05-11 PROCEDURE — 83036 HEMOGLOBIN GLYCOSYLATED A1C: CPT

## 2018-05-11 PROCEDURE — 80061 LIPID PANEL: CPT

## 2018-05-11 PROCEDURE — 84550 ASSAY OF BLOOD/URIC ACID: CPT

## 2018-05-11 PROCEDURE — 80053 COMPREHEN METABOLIC PANEL: CPT

## 2018-07-10 ENCOUNTER — HOSPITAL ENCOUNTER (OUTPATIENT)
Dept: HOSPITAL 44 - CARD | Age: 78
End: 2018-07-10
Attending: INTERNAL MEDICINE
Payer: MEDICARE

## 2018-07-10 DIAGNOSIS — I49.49: ICD-10-CM

## 2018-07-10 DIAGNOSIS — E78.5: ICD-10-CM

## 2018-07-10 DIAGNOSIS — Z95.2: Primary | ICD-10-CM

## 2018-07-10 DIAGNOSIS — I50.9: ICD-10-CM

## 2018-07-10 DIAGNOSIS — N18.9: ICD-10-CM

## 2018-07-10 DIAGNOSIS — E11.9: ICD-10-CM

## 2018-07-10 DIAGNOSIS — Z86.718: ICD-10-CM

## 2018-07-10 LAB
EGFR (AFRICAN): 35
EGFR (NON-AFRICAN): 29

## 2018-07-10 PROCEDURE — 80048 BASIC METABOLIC PNL TOTAL CA: CPT

## 2018-07-10 PROCEDURE — 84443 ASSAY THYROID STIM HORMONE: CPT

## 2018-07-31 ENCOUNTER — HOSPITAL ENCOUNTER (OUTPATIENT)
Dept: HOSPITAL 44 - CARD | Age: 78
End: 2018-07-31
Attending: INTERNAL MEDICINE
Payer: MEDICARE

## 2018-07-31 DIAGNOSIS — I35.9: Primary | ICD-10-CM

## 2018-08-07 ENCOUNTER — HOSPITAL ENCOUNTER (OUTPATIENT)
Dept: HOSPITAL 44 - CARD | Age: 78
End: 2018-08-07
Attending: INTERNAL MEDICINE
Payer: MEDICARE

## 2018-08-07 DIAGNOSIS — Z95.2: Primary | ICD-10-CM

## 2018-08-07 DIAGNOSIS — E78.5: ICD-10-CM

## 2018-08-07 DIAGNOSIS — I50.9: ICD-10-CM

## 2018-08-07 DIAGNOSIS — E11.9: ICD-10-CM

## 2018-08-07 DIAGNOSIS — N18.9: ICD-10-CM

## 2018-08-07 DIAGNOSIS — I82.409: ICD-10-CM

## 2018-08-07 PROCEDURE — 36415 COLL VENOUS BLD VENIPUNCTURE: CPT

## 2018-08-07 PROCEDURE — 80048 BASIC METABOLIC PNL TOTAL CA: CPT

## 2018-08-21 ENCOUNTER — HOSPITAL ENCOUNTER (OUTPATIENT)
Dept: HOSPITAL 44 - RAD | Age: 78
End: 2018-08-21
Attending: PODIATRIST
Payer: MEDICARE
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
## 2018-08-21 DIAGNOSIS — S90.451A: Primary | ICD-10-CM
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
## 2018-08-21 PROCEDURE — 73630 X-RAY EXAM OF FOOT: CPT

## 2018-08-21 NOTE — DIAGNOSTIC IMAGING REPORT
MARY SAAVEDRA 

[facility]

[address] [facility].[address] [address]

 

 

 

 

Report Submission Date: Aug 21, 2018 6:11:43 PM CDT

Patient       Study

Name:   EVELIA JAIMES       Date:   Aug 21, 2018 2:33:36 PM CDT

MRN:   [id number]       Modality Type:   DX

Gender:   F       Description:   LOWER EXTREMITY

:   36       Institution:   [facility]

Physician:   MARY SAAVEDRA

     Accession:    [id number]

 

 

Left foot 



History:  Open wound to the 3rd digit 



Three views of the left foot were obtained which demonstrate lucency of the soft
tissues as well as soft tissue swelling involving the 3rd digit consistent with 
cellulitis and an adjacent ulcer.  The entire 3rd distal phalanx is 
demineralized and partly resorbed.  These findings would be consistent with 
osteomyelitis involving the entire 3rd distal phalanx.  There is no evidence for
osteomyelitis of the 3rd proximal or middle phalanges.  The remainder of the 
foot is normally mineralized.  There is a small dorsal and a large plantar 
calcaneal spur. 



Impression: 



Soft tissue swelling of the 3rd digit is present and there is a distal ulcer.  
The entire 3rd distal phalanx is demineralized and partly resorbed consistent 
with osteomyelitis involving entire 3rd distal phalanx but there is no evidence 
for osteomyelitis of the 3rd proximal or middle phalanges.

 

Electronically signed on Aug 21, 2018 6:11:43 PM CDT by:

Nadine APARICIO

## 2018-08-27 ENCOUNTER — HOSPITAL ENCOUNTER (OUTPATIENT)
Dept: HOSPITAL 44 - POD | Age: 78
End: 2018-08-27
Attending: PODIATRIST
Payer: MEDICARE

## 2018-08-27 DIAGNOSIS — S90.415A: Primary | ICD-10-CM

## 2018-08-27 DIAGNOSIS — L08.9: ICD-10-CM

## 2018-08-27 PROCEDURE — 11042 DBRDMT SUBQ TIS 1ST 20SQCM/<: CPT

## 2018-08-30 ENCOUNTER — HOSPITAL ENCOUNTER (OUTPATIENT)
Dept: HOSPITAL 44 - LABRHC | Age: 78
End: 2018-08-30
Attending: FAMILY MEDICINE
Payer: MEDICARE

## 2018-08-30 DIAGNOSIS — E11.9: Primary | ICD-10-CM

## 2018-08-30 PROCEDURE — 83036 HEMOGLOBIN GLYCOSYLATED A1C: CPT

## 2018-09-12 ENCOUNTER — HOSPITAL ENCOUNTER (OUTPATIENT)
Dept: HOSPITAL 44 - POD | Age: 78
End: 2018-09-12
Attending: PODIATRIST
Payer: MEDICARE

## 2018-09-12 DIAGNOSIS — E11.9: ICD-10-CM
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
## 2018-09-12 DIAGNOSIS — S91.109A: ICD-10-CM

## 2018-09-12 DIAGNOSIS — L08.9: ICD-10-CM

## 2018-09-12 DIAGNOSIS — S90.415A: Primary | ICD-10-CM

## 2018-09-12 DIAGNOSIS — G62.9: ICD-10-CM

## 2018-09-12 PROCEDURE — 99214 OFFICE O/P EST MOD 30 MIN: CPT

## 2018-09-12 PROCEDURE — 11042 DBRDMT SUBQ TIS 1ST 20SQCM/<: CPT

## 2018-09-13 NOTE — OP CLINIC PROGRESS NOTE
SUBJECTIVE:

Patient is an 78-year-old female presenting today for follow up of a left 3rd 
toe wound, as well as last week's onset of a left 2nd toe wound.  This patient 
has been applying triple antibiotic and a Band-Aid to the 2nd and 3rd toes daily
since her last visit.  The patient has finished her antibiotics, as she was 
finishing the last time I saw her.  The patient also has begun utilizing her 
Lotrisone cream that was also prescribed and states that her lower left leg is 
doing better at this time.   The patient still has more medication left at this 
time and will call us for a refill when needed.  The patient denies any current 
concerns or worries and is here and looking forward to finding out her MRI 
results.  The patient does not admit to any fevers, chills, nausea, vomiting, 
shortness of breath or chest pain.  The patient reports that the only doctors 
that she sees outside of her Orthopedic specialist is Cardiology and her primary
care doctor, Dr. Nieto.  The patient states that she saw Dr. Albarran, her 
cardiologist, recently who cleared her for surgery; however, she was encouraged 
to contact them in order to notify them that it is an operating room procedure 
under MAC anesthesia, as opposed to just an office procedure with local, as Dr. Albarran's most recent note suggested.  The patient knows that we need to get written
clearance from Dr. Albarran before surgery.  The patient will also need to get 
written clearance from Dr. Nieto prior to surgery.  



OBJECTIVE:

Vascular:   Dorsalis pedis and posterior tibial pulses of the left foot are 2+. 
Capillary refill time is less than 3 seconds to the left foot.  There is mild 
edema noted to the left 3rd toe, consistent with last.  

Derm:   There is a very small eschar noted at the left 3rd toe distal tip where 
the wound was previously.  This appears clean and without any significant 
erythema.  This toe appears to be dry and healed outside of the small eschar 
overlying the wound which is left intact today.  The left 2nd toe distal tip, 
which had a callus last week which was debrided to reveal a wound, is still 
present and measures approximately 0.2 x 0.2 x 0.1 cm deep.  There is a small 
ring of necrotic tissue, which was from the silver nitrate last week, that was 
debrided today.  A nice granular base visualized after debridement today and 
this should heal fairly quickly.  There was no other erythema noted outside of 
perhaps mild erythema at the 3rd toe and some warmth.  The skin appears to be 
healing overall fairly well at the left 3rd toe and we will watch it carefully 
on the left 2nd toe.  There are no other open lesions, varicosities, or 
hyperkeratosis.  The skin at the distal aspect of the left lower leg is clearing
up from xerotic changes seen previously.  There are still some noted at the 
plantar aspect of the foot and a little bit distal of lower left leg and the 
patient was encouraged to continue utilizing the Lotrisone cream.  

Musculoskeletal:   There is mild pain on palpation noted at the left 3rd toe 
still even though the wound appears to be well healed with a small eschar.  
There is no pain on palpation obviously noted at the 2nd toe of the left foot.  
There is distal contracture noted at the second toe, which is significant, 
causing a claw toe deformity of the 2nd toe, as well as the 3rd toe.   There are
no other gross abnormalities noted at this time. 

Neurologic:  Light touch sensation is intact to the toes of the left foot, 
however, it does seem to be limited. 



ASSESSMENT: 

1.   Infected abrasion of toe of left foot, subsequent encounter.

2.   Hammer toes of both feet.

3.   Diabetes mellitus type 2. 

4.   Peripheral polyneuropathy.

5.   Eczema of the lower leg. 

6.   Open wound of the 2nd toe, subsequent encounter.

7.   Osteomyelitis of left 3rd toe.



PROCEDURE:

Sharp debridement was performed utilizing a number 15 blade to the left 2nd toe 
distal tip wound removing a small bit of necrotic tissue around the edge and the
granular base debrided to a bleeding healthy base.  This was then dressed with 
triple antibiotic ointment and a Band-Aid.  Hemostasis was achieved with 
pressure.  This was measuring approximately 0.2 x 0.2 x 0.1 cm deep incised.



DIAGNOSTIC RESULTS:

MRI results were discussed with the patient which were suggestive of possible 
osteomyelitis in the left 3rd toe.  It should be noted that the read we 
initially received suggested the left 4th toe for the concern for osteomyelitis.
 The provider was called and they confirmed that they met to put the left 3rd 
toe for the osteomyelitis.  



PLAN: 

This was discussed with the patient and the options for treatment including 6 
weeks of IV antibiotics with an Infectious Disease doctor and possible risk of 
kidney disease versus a toe amputation versus leaving it alone completely and 
seeing if it continues to ulcerate and re-infect as it has previously done 
multiple times.  After discussing the risks and benefits of these different 
alternatives, the patient has decided to go forward with a toe amputation of the
left 3rd toe and we will plan to do that on October 1.  The patient understands 
she will need appropriate Cardiology clearance, as well as clearance from her 
primary care physician, Dr. Nieto, as well as new labs including a CBC, BMP, 
chest x-ray, EKG, PT and INR coagulation studies.  The patient will work on 
obtaining these and we will make sure these are ready to go prior to surgery for
October 1.  The patient was explained that this will likely be under twilight 
anesthesia/MAC anesthesia but could be a general anesthesia if the anesthetist 
deems it necessary.  The patient understood the risks and benefits of a toe 
amputation included, but are not limited to, bleeding, infection, under 
correction, over correction, need for additional surgery, etc.  The patient is 
happy with this plan in going forward.  She has no further questions at this 
time.  The patient had a recent A1C of 7.7% on August 7, 2018, and this will 
likely have a risk of inhibiting her healing; however, due to the nature of the 
osteomyelitis, I believe it is important to go ahead and perform the left 3rd 
toe amputation/disarticulation at the metatarsophalangeal joint for the best 
option of healing.  Her blood flow seems to be beautiful upon clinical 
evaluation and therefore, no arterial studies will be performed at this time.  
Surgical consent was obtained and signed and will be scanned and ready for 
surgery.  The patient will be placed on an antibiotic, doxycycline, for 100 mg 
daily for the next 19 days leading up to surgery in order to have a load of 
antibiotics to keep her infection at bay until surgery can be performed.  The 
patient will return to clinic sooner to see another provider if needed while I 
am out of town if any signs of infection ensue.  The patient understands this 
and is happy to go forward with this plan.  The patient also had discussed the 
need for likely a second hammer toe procedure once that wound heals and that can
be done once the left 3rd toe has healed.  The patient understands we cannot do 
this at the same time, as I do not feel comfortable placing a K-wire through the
open wound at the left 2nd toe if we were to do the surgery now.  The patient is
happy to go forward with this plan as well and we will discuss surgery at a 
later date for the left 2nd toe to prevent further ulceration there.  





cc:  Dr. Raphael APARICIO

## 2018-09-27 ENCOUNTER — HOSPITAL ENCOUNTER (OUTPATIENT)
Dept: HOSPITAL 44 - LAB | Age: 78
End: 2018-09-27
Attending: PODIATRIST
Payer: MEDICARE

## 2018-09-27 DIAGNOSIS — G62.9: ICD-10-CM
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
## 2018-09-27 DIAGNOSIS — L08.9: Primary | ICD-10-CM

## 2018-09-27 DIAGNOSIS — M20.42: ICD-10-CM

## 2018-09-27 DIAGNOSIS — S90.415A: ICD-10-CM

## 2018-09-27 DIAGNOSIS — M20.41: ICD-10-CM
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
## 2018-09-27 DIAGNOSIS — S91.109A: ICD-10-CM
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
## 2018-09-27 DIAGNOSIS — E11.9: ICD-10-CM

## 2018-09-27 LAB
BASOPHILS NFR BLD: 0.5 % (ref 0–1.5)
EGFR (NON-AFRICAN): > 60
EOSINOPHIL NFR BLD: 1 % (ref 0–6.8)
LABORATORY COMMENT REPORT: 1.25 THOU/UL (ref 0.6–4)
MCH.: 29.3 PG (ref 28–34)
MCV RBC: 93.1 FL (ref 80–100)
MONOCYTE ABS #: 0.3 THOU/UL (ref 0–0.9)
MONOCYTES NFR BLD: 6 % (ref 0–11)
PLATELET # BLD: 203 THOU/UL (ref 130–400)

## 2018-09-27 PROCEDURE — 85610 PROTHROMBIN TIME: CPT

## 2018-09-27 PROCEDURE — 36415 COLL VENOUS BLD VENIPUNCTURE: CPT

## 2018-09-27 PROCEDURE — 71046 X-RAY EXAM CHEST 2 VIEWS: CPT

## 2018-09-27 PROCEDURE — 85025 COMPLETE CBC W/AUTO DIFF WBC: CPT

## 2018-09-27 PROCEDURE — 80053 COMPREHEN METABOLIC PANEL: CPT

## 2018-10-01 ENCOUNTER — HOSPITAL ENCOUNTER (OUTPATIENT)
Dept: HOSPITAL 44 - OPSURG | Age: 78
End: 2018-10-01
Attending: PODIATRIST
Payer: MEDICARE

## 2018-10-01 DIAGNOSIS — M86.9: Primary | ICD-10-CM

## 2018-10-01 DIAGNOSIS — I50.9: ICD-10-CM

## 2018-10-01 DIAGNOSIS — G62.9: ICD-10-CM

## 2018-10-01 DIAGNOSIS — J44.9: ICD-10-CM

## 2018-10-01 DIAGNOSIS — I13.0: ICD-10-CM

## 2018-10-01 DIAGNOSIS — N18.9: ICD-10-CM

## 2018-10-01 DIAGNOSIS — E11.22: ICD-10-CM

## 2018-10-01 PROCEDURE — 88305 TISSUE EXAM BY PATHOLOGIST: CPT

## 2018-10-01 PROCEDURE — S1016 NON-PVC INTRAVENOUS ADMINIST: HCPCS

## 2018-10-01 PROCEDURE — 28810 AMPUTATION TOE & METATARSAL: CPT

## 2018-10-01 NOTE — OPERATIVE NOTE
SURGEON: 

Seth Steen DPM



ASSISTANT: 

None. 



ANESTHESIA: 

MAC anesthesia with local.



SPECIMEN REMOVED:

Left 3rd toe disarticulation at the metatarsophalangeal joint.  



BLOOD LOSS:

Less than 10 mL. 



SURGICAL COUNTS:

Surgical counts were correct.  



IMPLANTS:

None.  



PREOPERATIVE DIAGNOSES: 

Left 3rd toe osteomyelitis.



POSTOPERATIVE DIAGNOSES: 

Left 3rd toe osteomyelitis.



PROCEDURE PERFORMED: 

Left 3rd toe amputation.



FINDINGS:

None. 



INDICATIONS FOR PROCEDURE: 

Sydnie Foy is an 78-year-old female who presented earlier in August to the
clinic for a left 3rd toe wound.  This wound was recurrent and has been infected
multiple times.  This wound has had a very difficult time healing and staying 
closed up until this time.  The patient began local wound care with myself and 
imaging to rule out osteomyelitis.  Due to the appearance of the toe wound and 
toe and also combined with obvious signs on x-ray and MRI, it was found that the
patient osteomyelitis to the left 3rd toe distal phalanx.  The patient had 
options for treatment going forward discussed that included leaving the toe 
alone and seeing what happens with possible risk of infection, etc. causing 
further proximal amputation or aggressive wound care alone with IV antibiotics 
for 6 weeks with the risk of kidney dysfunction or damage versus left 3rd toe 
amputation without the need for IV antibiotics for 6 weeks.  The patient has 
decided to go forward with left 3rd toe amputation at this time with primary 
closures.  The risks and benefits were discussed with the patient that include, 
but are not limited to, bleeding, infection, undercorrection, overcorrection, 
need for additional surgery, loss of limb, and loss of life and the patient has 
agreed by both written and verbal consent to go forward with surgery at this 
time.  The consent was signed and placed in the chart.  A History and Physical 
was performed prior to surgery this morning and myself, as well as Anesthesia, 
agreed that she was deemed ready to go for surgery.  Appropriate clearance was 
obtained with labs and imaging from both her primary care doctor, Dr. Nieto, as 
well as her cardiologist, Dr. Albarran.  These are also in the pre-op chart.  



PROCEDURE IN DETAIL: 

The patient was brought to the preoperative area today and the operative site 
was marked with an indelible pen.   Under mild sedation, the patient was brought
to the operating room and placed on the table in the supine position.  A time-
out was called in the presence of the operating room team and all present were 
agreement on the patient name, location, and type of procedure.  MAC anesthesia 
then obtained about the patient and under this, local anesthesia was then 
obtained about the left 3rd distal metatarsal area utilizing a total of 12 mL of
a 1:1 mix of 1% Lidocaine plain and 0.5% Marcaine plain.  The patient was then 
scrubbed, prepped, and draped in the usual aseptic fashion with a Betadine prep 
due to the left 2nd toe wound as well that she has had.  Again, after having the
left foot scrubbed, prepped, and draped in the usual sterile aseptic fashion, 
surgery was begun.  There was no tourniquet utilized on this patient as I wanted
to make sure that she had good bleeding down to the toe.  



The incision was carried out from medial to lateral going over the dorsal side 
and then a second incision over the plantar side but a little bit further out in
length in order to create a sufficient plantar flap.  This incision was carried 
down to the level of the bone and the toe bone was  from the remaining 
portion of the subcutaneous tissue with a #15 blade keeping the blade against 
the bone to avoid any neurovascular injury.  At this time, the toe was 
disarticulated at the 3rd metatarsophalangeal joint.  The toe was sent in 2 
pieces, however, first the middle and the distal phalanx together and then 
separately, the proximal phalanx all sent in 1 specimen cup.  These were sent 
for pathology.  After having removed the left 3rd toe at the 3rd 
metatarsophalangeal joint, the 3rd metatarsal head was visualized and palpated 
and found to be firm and without any evidence of infection.  The left 3rd toe 
site was then flushed with a copious amount of normal saline.  The dorsal and 
plantar tendons were then resected and the plantar flap was shortened slightly 
with a #15 blade and a pickup.   The wound site was flushed again with a copious
amount of normal saline and hemostasis obtained with pressure.  At this time, 
one 3-0 Vicryl was utilized to help hold the dermal layer together and the 
distal central aspect of the surgical site and following this, 3-0 nylon in a 
simple interrupted suture fashion was performed to bring the skin together 
without strangulation of the skin with good approximation.  The skin was nice 
and pink upon bringing it together.  The dressings were than applied consisting 
of Adaptic 4 x 4 gauze, Karla, and a 4-inch Kerlix and 4-inch Ace wrap.  The 
patient was emerged from MAC anesthesia and transferred to recovery with stable 
vital signs and vascular status intact to the remaining toes of the left foot.  
The patient and her granddaughter were talked to in the PACU and it was 
discussed that we would have her back on Wednesday for a follow up appointment 
and she can continue her antibiotics only for a week, as opposed to the, I 
think, 19 days that were on the prescription that she was supposed to get 2 
weeks ago.  It should be noted that there were pharmacy issues that somehow they
were not getting the prescription that we kept sending over.  Either way, the 
patient understands and her granddaughter knows that she will only take this for
a week.  I believe it was doxycycline.  The patient, again, will follow up on 
Wednesday in the office.  This will be to check for any signs of infection and 
re-dress her surgical site and she knows that she is to leave the dressings 
completely clean, dry, and intact and if they do get wet for any reason, she is 
to return to the office promptly so we can change the dressings.  Surgery was 
tolerated well and the patient and her family had no further questions prior to 
surgery or after surgery.  The patient looks like she will heal well and due to 
the hammer toe on her 2nd toe that has created a wound, we discussed that in the
future once she heals from this, we will discuss any surgical correction of the 
left 2nd toe as well to help prevent recurrent ulceration as well.  This will be
done as desired by the patient. 



COMPLICATIONS: 

Patient tolerated the procedure well. 





cc:   Dr. Manuel APARICIO

## 2018-10-03 ENCOUNTER — HOSPITAL ENCOUNTER (OUTPATIENT)
Dept: HOSPITAL 44 - POD | Age: 78
End: 2018-10-03
Attending: PODIATRIST
Payer: MEDICARE

## 2018-10-03 DIAGNOSIS — Z89.422: Primary | ICD-10-CM

## 2018-10-03 PROCEDURE — 99213 OFFICE O/P EST LOW 20 MIN: CPT

## 2018-10-07 ENCOUNTER — HOSPITAL ENCOUNTER (EMERGENCY)
Dept: HOSPITAL 44 - ED | Age: 78
Discharge: HOME | End: 2018-10-07
Payer: MEDICARE

## 2018-10-07 VITALS
DIASTOLIC BLOOD PRESSURE: 75 MMHG | SYSTOLIC BLOOD PRESSURE: 150 MMHG | DIASTOLIC BLOOD PRESSURE: 75 MMHG | DIASTOLIC BLOOD PRESSURE: 75 MMHG | DIASTOLIC BLOOD PRESSURE: 75 MMHG | DIASTOLIC BLOOD PRESSURE: 75 MMHG | DIASTOLIC BLOOD PRESSURE: 75 MMHG | SYSTOLIC BLOOD PRESSURE: 150 MMHG | DIASTOLIC BLOOD PRESSURE: 75 MMHG | SYSTOLIC BLOOD PRESSURE: 150 MMHG | DIASTOLIC BLOOD PRESSURE: 75 MMHG | SYSTOLIC BLOOD PRESSURE: 150 MMHG | SYSTOLIC BLOOD PRESSURE: 150 MMHG | SYSTOLIC BLOOD PRESSURE: 150 MMHG | SYSTOLIC BLOOD PRESSURE: 150 MMHG | SYSTOLIC BLOOD PRESSURE: 150 MMHG
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
## 2018-10-07 DIAGNOSIS — S80.01XA: Primary | ICD-10-CM
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
## 2018-10-07 PROCEDURE — 99282 EMERGENCY DEPT VISIT SF MDM: CPT

## 2018-10-07 PROCEDURE — 73562 X-RAY EXAM OF KNEE 3: CPT

## 2018-10-07 NOTE — ED PHYSICIAN DOCUMENTATION
General Adult





- HPI


Stated Complaint: Fall


Chief Complaint: General Adult


Additional Information: 





Fell when she stepped up on curb at [employer] in [place] this afternoon. 

Landed on right knee and hands and face. She had toe amputation here on 10/3 and

was trying to keep the foot out of rain puddles. No LOC. Got herself up from the

parking lot. She has an abrasion right knee but can mve it fully w/o pain. 

Concerned though, as she had R TKR 20 years ago. Denies malocclusion. Dentures 

intact. Hit her chin but says it feels fine. Did not break her glasses. On 

Eliquis. No other modifying factors or associated signs. 





- ROS


CONST: no problems





- PAST HX


Past History: hypertension, other (NIDDM, HLD)


Surgeries/Procedures: none (Will TKR's)


Allergies/Adverse Reactions: 


                                    Allergies











Allergy/AdvReac Type Severity Reaction Status Date / Time


 


Sulfa (Sulfonamide Allergy Unknown Generalized Verified 18 13:39





Antibiotics)   Swelling  

















- SOCIAL HX


Smoking History: non-smoker





- FAMILY HX


Family History: No





- VITAL SIGNS


Vital Signs: 





                                   Vital Signs











Temp Pulse Resp BP Pulse Ox


 


          121/60    


 


          18 16:41   














- REVIEWED ASSESSMENTS


Nursing Assessment  Reviewed: Yes


Vitals Reviewed: Yes





Progress





- Progress


Progress: 








Report Submission Date: Oct 7, 2018 5:50:49 PM CDT


Patient       Study


Name:   EVELIA JAIMES       Date:   Oct 7, 2018 5:04:57 PM CDT


MRN:   [id number]       Modality Type:   DX


Gender:   F       Description:   LOWER EXTREMITY


:   36       Institution:   [facility]


Physician:   KILEY WASHINGTON - ER


     Accession:    [id number]


 


 


Right knee 3 views 


Clinical history pain 


Technique AP lateral oblique 


Findings: There is no intact right knee arthroplasty. No fracture dislocation is

identified. Which popliteal artery calcification. 


Impression: Intact tricompartmental right knee arthroplasty with no acute 

pathology


 


Electronically signed on Oct 7, 2018 5:50:49 PM CDT by:


Wero Crum





ED Results Lab/Radiology





- Orders


Orders: 





                                    ED Orders











 Category Date Time Status


 


 Apply occlusive dressing D Care  10/07/18 17:06 Ordered


 


 KNEE 3 VIEWS [RAD] Stat Exams  10/07/18 Ordered


 


 Neomycin/Bacitracin/Polymyxinb [Triple Antibiotic Med  10/07/18 17:06 Once





 Ointment]   





 1 each TP NOW ONE   














General Adult Physical Exam





- PHYSICAL EXAM


GENERAL APPEARANCE: no distress


EENT: eye inspection normal, ENT inspection normal (dentures), pharynx normal


NECK: normal inspection, supple


RESPIRATORY: no resp distress, breath sounds normal


CVS: reg rate & rhythm, heart sounds normal, murmur (1/6 ERIKA)


ABDOMEN: soft


SKIN: warm/dry, normal color (except slight abrasion left ant knee, Superficial 

abrasion R ant knee with purple ecchymosis and some swelling)


EXTREMITIES: normal range of motion, no evidence of injury


NEURO: CN's nml as tested, motor nml, sensation nml, cognition normal





Discharge


Clincal Impression: 


Fall


Qualifiers:


 Encounter type: initial encounter Qualified Code(s): W19.XXXA - Unspecified 

fall, initial encounter





Contusion, knee


Qualifiers:


 Encounter type: initial encounter Laterality: right Qualified Code(s): S80.01XA

- Contusion of right knee, initial encounter





Referrals: 


Raphael Nieto MD [Primary Care Provider] - 2 Days


Condition: Good


Disposition: 01 HOME, SELF-CARE


Decision to Admit: NO


Decision Time: 18:00

## 2018-10-07 NOTE — DIAGNOSTIC IMAGING REPORT
KILEY WASHINGTON 

[facility]

[address] [facility] [address] [address]

 

 

 

 

Report Submission Date: Oct 7, 2018 5:50:49 PM CDT

Patient       Study

Name:   EVELIA JAIMES       Date:   Oct 7, 2018 5:04:57 PM CDT

MRN:   [id number]       Modality Type:   DX

Gender:   F       Description:   LOWER EXTREMITY

:   36       Institution:   [facility]

Physician:   KILEY WASHINGTON

     Accession:    [id number]

 

 

Right knee 3 views 

Clinical history pain 

Technique AP lateral oblique 

Findings: There is no intact right knee arthroplasty. No fracture dislocation is
identified. Which popliteal artery calcification. 

Impression: Intact tricompartmental right knee arthroplasty with no acute 
pathology

 

Electronically signed on Oct 7, 2018 5:50:49 PM CDT by:

Wero APARICIO

## 2018-10-08 NOTE — OP CLINIC PROGRESS NOTE
SUBJECTIVE:

Sydnie Foy is a pleasant 78-year-old female who presents today for follow 
up on surgery post-op day #2, original date of surgery was October 1, 2018.   
The patient presents today with concern for bleeding that she had the same day, 
as well as a little bit more these last couple of days.  The patient does not 
have any other complaints at this time except for a mild pulsating feeling of 
achiness in the left foot near the amputation site.  The patient does not admit 
to any fever, chills, nausea, vomiting, shortness of breath or chest pain at 
this time.  She does not have any other concerns or questions at this time.  The
patient presents for planned dressing change and checking for any signs of 
infection as planned today.  



OBJECTIVE:

Vital signs:  T: 96.1 degrees Fahrenheit, R: 20, heart rate:  79, pulse oximetry
is 97% at room air, and left upper extremity sitting blood pressure was 123/63.

Vascular:   DP and PT pulses are at 2+ of the left foot.  Capillary refill time 
was less than 3 seconds to the toes of the left foot.  Mild edema noted with 
obvious signs of ecchymosis.  It should be noted that there was a significant 
amount of bleeding in the dressing, some dark and some bright red.  Upon removal
of the dressings, it was noted that there was still very slow mild bleeding out 
of a small portion of the amputation site between stitches.  

Dermatologic:  Moderate ecchymosis was noted in the distal forefoot of the left 
foot.  Surgical site is intact with sutures well coapted without strangulation. 
There was mild swelling noted about the amputation site and as noted, a marked 
amount of ecchymosis of the left forefoot.  There is no erythema and no 
purulence and no malodor noted.  

Musculoskeletal:  There is mild pain on palpation at the left 3rd toe amputation
site.  There is no other gross abnormalities noted.  

Neurologic:   There is light touch sensation intact to the toes of the left 
foot.  



ASSESSMENT:

1.   Aftercare following surgery.

2.   Previous osteomyelitis of the left 3rd toe. 



PLAN: 

Patient is day 2 status post left 3rd toe amputation.  The dressings were 
removed and a very mild amount of bleeding was noted.  This was controlled with 
pressure and a brief application of silver nitrate to the single spot where 
there was bleeding very slowly emanating from the amputation site.  Bleeding was
controlled with this maneuver.  Dressings were applied consisting of Adaptic 4 x
4 gauze, 2-inch Karla roll, 4-inch Kerlix, and a 4-inch Ace wrap tapped down.  
Patient is tolerating the surgery well and has very mild pain and is taking 
tramadol per instructions given.  The patient is also taking some ibuprofen as 
needed.  The patient is to continue leaving the dressings clean, dry, and intact
and if they do get wet, she is to let us know as soon as possible so we can 
change them.  She was encouraged with increasing elevation on a couple of 
pillows on her bed, as she has been in bed a lot of the time.  She is also 
encouraged to ice with a frozen bag of peas or corn around her ankle to help 
cool the blood going down.  She is to continue ambulating as needed in a 
surgical shoe at all times weightbearing.  The patient will return to clinic in 
a week from Monday, which is 12 days from now.   This will be at about 2 weeks 
status post surgery.  We will consider suture removal at that time if it is well
healed, otherwise, we will hold off for another week.  





cc:   Dr. Raphael APARICIO

## 2018-10-15 ENCOUNTER — HOSPITAL ENCOUNTER (OUTPATIENT)
Dept: HOSPITAL 44 - POD | Age: 78
End: 2018-10-15
Attending: PODIATRIST
Payer: MEDICARE

## 2018-10-15 DIAGNOSIS — L57.0: ICD-10-CM

## 2018-10-15 DIAGNOSIS — Z89.422: Primary | ICD-10-CM

## 2018-10-15 DIAGNOSIS — M86.9: ICD-10-CM

## 2018-10-15 DIAGNOSIS — M20.42: ICD-10-CM

## 2018-10-15 PROCEDURE — 11055 PARING/CUTG B9 HYPRKER LES 1: CPT

## 2018-10-15 PROCEDURE — 99213 OFFICE O/P EST LOW 20 MIN: CPT

## 2018-10-22 ENCOUNTER — HOSPITAL ENCOUNTER (OUTPATIENT)
Dept: HOSPITAL 44 - POD | Age: 78
End: 2018-10-22
Attending: PODIATRIST
Payer: MEDICARE

## 2018-10-22 DIAGNOSIS — I50.9: ICD-10-CM

## 2018-10-22 DIAGNOSIS — M35.00: ICD-10-CM

## 2018-10-22 DIAGNOSIS — E11.9: ICD-10-CM

## 2018-10-22 DIAGNOSIS — Z89.422: Primary | ICD-10-CM

## 2018-10-22 DIAGNOSIS — I11.0: ICD-10-CM

## 2018-10-22 PROCEDURE — 99214 OFFICE O/P EST MOD 30 MIN: CPT

## 2018-10-23 ENCOUNTER — HOSPITAL ENCOUNTER (OUTPATIENT)
Dept: HOSPITAL 44 - RAD | Age: 78
End: 2018-10-23
Attending: PODIATRIST
Payer: MEDICARE

## 2018-10-23 DIAGNOSIS — E11.9: Primary | ICD-10-CM

## 2018-10-23 DIAGNOSIS — I87.2: ICD-10-CM

## 2018-10-23 PROCEDURE — 93971 EXTREMITY STUDY: CPT

## 2018-10-23 NOTE — DIAGNOSTIC IMAGING REPORT
MARY SAAVEDRA 

[facility]

[address] [facility].[address] [address]

 

 

 

 

Report Submission Date: Oct 23, 2018 3:34:14 PM CDT

Patient       Study

Name:   EVELIA JAIMES       Date:   Oct 23, 2018 10:58:00 AM CDT

MRN:   [id number]       Modality Type:   US

Gender:   F       Description:   [facility]

:   36       Institution:   [facility]

Physician:   MARY SAAVEDRA

     Accession:    [id number]

 

 

Examination: Ultrasound right vein 



History: Calf discomfort 



Findings: Sonographic evaluation of the right lower extremity venous system from
the groin to the popliteal fossa inclusive. Normal compressibility. No luminal 
filling defect. Normal waveforms and response to augmentation. No popliteal 
region fluid collection. 



Impression: No evidence for deep venous thrombosis.

 

Electronically signed on Oct 23, 2018 3:34:14 PM CDT by:

Luis Antonio APARICIO

## 2018-10-23 NOTE — OP CLINIC PROGRESS NOTE
SUBJECTIVE:

Sydnie Foy is an 78-year-old female who presents today for follow up of a 
left 3rd toe amputation site.  The patient previously had some of the stitches 
removed at her last appointment and is here today to likely have the remaining 
sutures removed.  The patient denies any concerns or complaints and has had 
minimal to zero pain of the left foot amputation site area.  The patient does, 
however, complain of increased swelling over the last little while, as well as a
history of a DVT to the right lower extremity.  Due to this concern, we 
discussed the need to have a venous Doppler performed tomorrow morning as my 
suspicion index is higher due to the history of a DVT and recent surgery.  There
was no erythema and no pain of the right calf at this time, so my concern is not
terribly high, so I feel comfortable waiting until tomorrow.  The patient is 
ambulatory and so I do not see a need for DVT prophylaxis at this time.  The 
patient's surgery also is a very low risk concern for a DVT.  



The patient denies any fevers, chills, nausea, vomiting, shortness of breath or 
chest pain at this time.  



PAST MEDICAL HISTORY:

1.   Diabetes mellitus type 2.

2.   Previous infection in the bone of the left 3rd toe distal phalanx. 

3.   Congestive heart failure. 

4.   Hypertension. 

5.   Sjogren's syndrome. 



OBJECTIVE:

Vital Signs:  T: 96.8 degrees, BP: 126/60, heart rate 91, R: 18.  

Vascular:   DP and PT pulses are 2+ in the left foot.  Capillary refill time is 
less than 3 seconds to the toes of the left foot.  There is virtually zero edema
at this time to the left forefoot and no signs of ecchymosis at this time, which
has greatly improved since last time.  There is no bleeding upon initial 
examination.  There is moderate lower extremity edema noted to the right lower 
extremity lower leg area.  This is unilateral and fairly remarkable compared 
with the lack of swelling in the left lower extremity. 

Dermatologic:   Again, there is no ecchymosis noted at this time and no signs of
erythema.  There is mild hyperkeratotic/eschar tissue noted over the distal 
medial aspect of the amputation site of the previous left 3rd toe.  These 
remaining 3 sutures were removed at this time, as they do not appear to be doing
anything at this point.  There was a very small open superficial wound still 
healing at the distal medial aspect of the amputation site.  This measures 
approximately 0.3 x 0.2 x 0.1 cm deep.  This does not have any depth beyond what
is described above.  There are no signs of infection at this time.  Toenails 
were also long, thick, and dystrophic at the left 2nd and 4th toes with slight 
mobility to the left 2nd toenail.  

Musculoskeletal:  There is mild pain on palpation noted to the left 3rd toe 
amputation site.  There are also digital contractures noted of the left 2nd and 
4th toes especially, as well as hallux valgus deformity of the left great toe.  
The left 2nd toe digital contracture should be noted that more specifically 
there is an inability to reduce the DIPJ or PIPJ and would need surgical 
correction in order to straighten this toe.  The previous hyperkeratosis noted 
on the left 2nd toe and previous wound is healed at this time with more stable 
epithelium but still slightly fragile.  There was no open wound there at this 
time status post left 3rd toe amputation site at metatarsophalangeal joint on 
October 1, 2018.  There was no pain with calf squeeze about the right lower 
extremity where the swelling is fairly significant.

Neurologic:  There is light touch sensation intact to the toes of the left foot.




ASSESSMENT:

1.   Aftercare following surgery.

2.   Previous osteomyelitis of the left 3rd toe. 

3.   Healing surgical site.

4.   Dermatophytosis of nail. 

5.   Hammertoes of the left foot toes #2 and #4.

6.   Hyperkeratosis of left 2nd toe. 



PROCEDURE #1:

Removal of remaining sutures x3 about the left 3rd toe amputation site. 



DESCRIPTION OF PROCEDURE:

The wound site had a small eschar which was removed and there was a very small 
superficial wound noted measuring approximately 0.3 x 0.2 x 0.1 cm deep. This 
was covered with triple antibiotic ointment and a Band-Aid at this time.  The 
patient tolerated the small procedure well. 



PLAN:

1.   The patient will continue present management at this time.  

2.   She will continue to dress the site with triple antibiotic ointment and a 
Band-Aid daily.  She no longer needs large gauze wraps.  

3.   She is to monitor it closely for any signs of infection and report it as 
soon as possible as needed. 

4.   She is to still keep her foot clean and dry. 

5.   She may apply lotion at this time staying away completely from the surgical
site. 

6.   The patient understands this in its entirety.  



It should be noted that the previous left 2nd toe distal tip that was debrided 
at her last appointment down to intact fragile epithelium has slightly more 
stable epithelium noted.  There is no hyperkeratosis noted at this time and it 
has healed well.  



We discussed the plan to continue to work to heal this amputation site 
completely and hopefully next week at her next appointment, it will be 
completely healed.  The patient will continue dressing as described above.  We 
discussed the possibilities of either continuing appropriate Crest pad to 
protect the 2nd toe versus surgical treatment to correct the 2nd toe and 
potentially the 4th toe.  In order to fix the 2nd toe and straighten it, we 
would need to address the bunion of the 1st toe area as the 2nd toe lands on top
of the 1st toe.  The 4th toe is not causing any concern at this time and does 
not need to be fixed at this time but she may have problems in the future and we
will evaluate this decision at our next appointment.  We will discuss 
arthroplasty versus arthrodesis of the 2nd and possibly 4th toes at her next 
appointment, as well as possible bunion procedure in order to truly straighten 
the 2nd toe.  I do not want to do too much surgery on this patient due to her 
age and history of medical issues and only want to do this if the patient is 
adamant that she is unwilling to try a pad to protect the 2nd toe for the 
undetermined future.  I worry that even with the pad, she still opened up a 
wound and then we were able to get it healed up again.  We may need to do the 
procedure to help protect her from any serious infections in the future with her
diabetes.  We will have the patient return to clinic in 1 week where we will 
check and follow up on the wound and discuss any possible need for further 
surgical correction in the future, which would probably need to be some time in 
the beginning to the middle of November.   





cc:   Dr. Raphael APARICIO

## 2018-10-29 ENCOUNTER — HOSPITAL ENCOUNTER (OUTPATIENT)
Dept: HOSPITAL 44 - POD | Age: 78
End: 2018-10-29
Attending: PODIATRIST
Payer: MEDICARE

## 2018-10-29 DIAGNOSIS — M20.42: ICD-10-CM

## 2018-10-29 DIAGNOSIS — M20.41: ICD-10-CM

## 2018-10-29 DIAGNOSIS — L97.529: Primary | ICD-10-CM

## 2018-10-29 DIAGNOSIS — L85.1: ICD-10-CM

## 2018-10-29 PROCEDURE — 11055 PARING/CUTG B9 HYPRKER LES 1: CPT

## 2018-10-29 PROCEDURE — 11042 DBRDMT SUBQ TIS 1ST 20SQCM/<: CPT

## 2018-11-01 NOTE — OP CLINIC PROGRESS NOTE
SUBJECTIVE:

Sydnie Foy is an 78-year-old female who presented today to the clinic for 
follow-up care of her left 3rd toe amputation.  The patient has continued 
utilizing antibiotic ointment and a Band-Aid over the amputation site throughout
this last week since we last saw her.  She denies any concerns at this time and 
would like to plan on conservative treatment going forward for the left foot, as
she has a daughter who will be out of commission this upcoming month and wants 
to be available to help.  The patient denies any further questions or concerns. 
She does not admit to any fevers, chills, nausea, vomiting, shortness of breath 
or chest pain at this time.  The patient does not admit to any pain even though 
her heart rate was elevated when the initial vitals were taken but, at the end, 
vitals were taken again and she had a much more normal heart rate.  



OBJECTIVE:

VITAL SIGNS:  BP:  135/76, P: 133 at the beginning of the exam, T: 97.6, R: 20, 
oxygen saturation was 96%.   At the end of the visit, BP:  126/63, heart rate 
72. 

VASCULAR:  There is 2+ DP and PT pulses of the left foot.  Capillary refill time
was less than 3 seconds to the toes of the left foot.  There is no edema of the 
left foot and no ecchymosis.  

DERMATOLOGIC:   There is no ecchymosis noted to the left foot and no signs of 
erythema.  There is mild eschar overlying the incision site laterally which was 
removed and found to have a completely healed incision site.  There is no open 
wound at this time of the amputation site.  

There is notable hyperkeratosis at the distal tip of the left 2nd toe, as well 
as the right 3rd toe.  These were debrided down with a #15 blade.  The right 3rd
toe was debrided to intact epithelium and the left 2nd toe was debrided down to 
reveal a fairly significant sized open wound that was underlying the 
hyperkeratosis.  This wound measured 0.7 x 0.6 x 0.1 cm deep.  There was no 
erythema, malodor, or purulence at this time.  There were no other open lesions 
or hyperkeratosis noted of significance. 

MUSCULOSKELETAL:   There is no pain on palpation noted to bilateral feet.  There
is notable digital contracture about the 2nd toe at the proximal interphalangeal
joint, as well as hallux valgus of the left foot causing the 2nd toe to land on 
top of the 1st toe only slightly.  There is also a digital contracture noted 
about the 4th toe of the left foot.  There is also digital contracture noted 
about the 3rd toe of the right foot with mallet toe deformity noted.  There are 
no other gross abnormalities noted at this time outside of the history of being 
status post left 3rd toe amputation. 

NEUROLOGIC:   Light touch sensation is intact but seems to be slightly 
diminished about the toes of the left foot.  



ASSESSMENT:

1.   Aftercare following surgery.

2.   Previous osteomyelitis of the left 3rd toe.

3.   Healed surgical site. 

4.   Hammertoes of left foot, toes #2 and #4. 

5.   Hammertoes of right 3rd toe.

6.   Open ulcer of left 2nd toe.

7.   Hyperkeratosis of right 3rd toe distal tip.



PROCEDURE #1: 

Debridement of skin and soft tissue lesion of left 2nd toe distal tip with a #15
blade down to and including subcutaneous tissue measuring 0.7 x 0.6 x 0.1 cm 
deep.  Hemostasis obtained via pressure, triple antibiotic ointment, and a Band-
Aid applied.  The patient will continue antibiotic ointment and a Band-Aid to 
this site daily.  The patient will also begin utilizing a Crest pad about the 
left 2nd toe in order to help offload the distal tip going forward, now that the
left 3rd toe amputation site is healed.  



PROCEDURE #2:

Debridement of hyperkeratosis of right 3rd toe distal tip down to reveal intact 
epithelium. 

The patient tolerated this procedure well with a #15 blade.   There was no 
bleeding noted.  



Conservative versus surgical treatment was discussed with the patient again at 
this time and the patient has elected to go forward with conservative treatment 
utilizing Crest pads to bilateral feet to try and offload her distal tip of the 
hammertoes.  The patient was instructed that she my discontinue using her 
surgical shoe and may resume wearing her diabetic shoes.  After more discussion,
we decided that we will hold off on ordering new diabetic shoes until we have 
for sure decided that she will be okay with conservative treatment for these 
hammertoes.  



It was discussed that if we were to do any surgical treatment, I believe I could
avoid doing anything for the hallux and straightening the 2nd toe of the left 
foot with arthrodesis about the proximal interphalangeal joint since there is a 
little bit of room for me to move it into a slightly laterally abducted 
position.  I would want to do as little as possible on this patient, which is 
why I would try not to address the minor hallux valgus noted.  The patient 
understands this and appreciates it. 



PLAN: 

Return to clinic in 1 week.  We will assess the use of the pads, as well as 
being back in a regular shoe, as well as debride any overlying hyperkeratosis as
needed.  Once we heal this wound, we will determine if she would like to go 
forward with surgical treatment versus continuing conservative treatment.  





cc:  Dr. Raphael APARICIO

## 2018-11-05 ENCOUNTER — HOSPITAL ENCOUNTER (OUTPATIENT)
Dept: HOSPITAL 44 - POD | Age: 78
End: 2018-11-05
Attending: PODIATRIST
Payer: MEDICARE

## 2018-11-05 DIAGNOSIS — M20.42: ICD-10-CM

## 2018-11-05 DIAGNOSIS — L91.9: ICD-10-CM

## 2018-11-05 DIAGNOSIS — L97.519: ICD-10-CM

## 2018-11-05 DIAGNOSIS — M20.41: Primary | ICD-10-CM

## 2018-11-05 PROCEDURE — 11042 DBRDMT SUBQ TIS 1ST 20SQCM/<: CPT

## 2018-11-05 PROCEDURE — 11055 PARING/CUTG B9 HYPRKER LES 1: CPT

## 2018-11-06 NOTE — OP CLINIC PROGRESS NOTE
SUBJECTIVE:

Sydnie Foy is an 78-year-old female who presented today to the clinic for 
follow up of her left 3rd toe amputation site, as well as for her left 3rd toe 
ulcer, and states that she has calluses that need to be trimmed, as well as 
toenails to be trimmed.  The patient denies any major pain or concern, however, 
she does admit to minor pain to the distal tips of the medial and lateral great 
toenail border of the right foot.  The patient also complains of calluses to the
distal toes on the right foot and needs to follow up on the left 2nd toe ulcer 
distal tip.  The patient has been applying antibiotic ointment and a Band-Aid 
daily to the left 2nd toe.  She denies any concerns or questions at this time.  
She does not admit to any fevers, chills, nausea, vomiting, shortness of breath 
or chest pain at this time.  She does admit to higher blood pressures up to the 
170s lately and today, her blood pressure is elevated as well at 151/84.  She 
also admits to lower extremity swelling that comes and goes to bilateral lower 
extremities which are not nearly as bad today as they have been lately according
to the patient.  She also admits that the rash of sorts to her lower extremities
seems to be slightly worsening again.  She states that she is using the 
Lotrisone ointment but that it does not seem to be helping.  The patient was 
taken off of a blood pressure medication she states about 6 months ago by Dr. Nieto and she will see what she can do to get any medication adjustment due to 
her high blood pressure lately.  We discussed the importance of having her seen 
for that, as well as the lower extremity swelling bilaterally.   



PROBLEM LIST:

Includes but is not limited to:

1.   Chronic atrial fibrillation.

2.   Congestive heart failure. 

3.   Essential hypertension.

4.   Gastroesophageal reflux disease. 

5.   Diabetes mellitus type 2.

6.   Sjogren's syndrome.



OBJECTIVE:

VITAL SIGNS:  BP:  151/84, Pulse oximetry was 95%, heart rate 65, R: 16.

VASCULAR:    There are 2+ DP and PT pulses of the left and right feet.  
Capillary refill time is less than 3 seconds to the toes bilaterally.  There is 
mild edema of bilateral lower extremities.  There is no ecchymosis of the left 
foot.  

DERMATOLOGIC:  There is again no ecchymosis to the left foot and the left 3rd 
toe amputation site has healed completely and looks beautiful.  The skin edges 
are smoothing and look nice.  The left 2nd toe distal tip has a small 
superficial wound still present that was debrided today and measuring 0.4 x 0.3 
x 0.1 cm deep.  This is improved from last time, which then measured 0.7 x 0.6 x
0.1 cm deep.   This was debrided with a #15 blade and hemostasis was obtained 
with pressure and silver nitrate.   Antibiotic ointment and a Band-Aid were 
applied.  There is no erythema or malodor or purulence noted of bilateral feet. 
There is noted hyperkeratosis to the right distal tip of the 3rd and 4th toes, 
as well as hyperkeratotic tissue to the medial and lateral distal edge of the 
great toenail borders right great toe.  There is also long, thick, and 
discolored toenails bilaterally.  

MUSCULOSKELETAL:  There is mild pain on palpation to the distal edge of the 
medial and lateral borders of the right great toenail.  There are digital 
contractures noted to the right 2nd, 3rd, and 4th toes, as well as the left 2nd 
and 4th toes with essentially mallet toe deformities.  There is a history of 
left 3rd toe amputation.  

NEUROLOGIC:  Light touch sensation is intact but seems to be slightly diminished
about the toes of the left foot.  Light touch sensation is intact to the right 
foot and toes.



ASSESSMENT:  

1.   Aftercare following surgery. 

2.   Healed surgical site.

3.   Hammertoes of bilateral feet. 

4.   Open ulcer of left 2nd toe. 

5.   Dermatophytosis of toenails.

6.   Hyperkeratosis of right foot. 



PROCEDURE #1:

Debridement of skin and soft tissue lesion of the left 2nd toe distal tip with a
#15 blade down to and including the subcutaneous tissue measuring today 0.4 x 
0.3 x 01 cm deep, which is improved from the last visit.  Hemostasis was 
obtained via pressure and silver nitrate.  Triple antibiotic ointment and a 
Band-Aid were applied.  The patient will continue antibiotic ointment and a 
Band-Aid daily to this site.  The patient was to continue utilizing a Crest pad 
to the left and right feet to offload the distal tip of the hammertoes.  The 
patient tolerated the procedure well. 



PROCEDURE #2:

Debridement of hyperkeratosis on right 3rd toe and 4th toe distal tips down to 
reveal intact epithelium.  The patient tolerated the procedure well and this was
done with a #15 blade.  A mild amount of bleeding was noted from aggressive 
debridement of the hyperkeratosis of the right 4th toe and this was dressed with
triple antibiotic ointment and a Band-Aid.  The patient was instructed she may 
remove that Band-Aid tomorrow and should not need it anymore.  She is to 
continue utilizing a Crest pad to the right foot as well.  Sharp debridement of 
mild hyperkeratosis noted medial and lateral distal tip of great toe mentioned 
above without incident.  The patient tolerated this well. 



PROCEDURE #3: 

Toenails were trimmed of bilateral feet, toes 1 through 5 on the right and toes 
1, 2, 4 and 5 on the left, without incident.   The patient tolerated the 
procedure well. 



PLAN:

The left 3rd toe amputation site appears to be completely healed still and free 
of any concerns and is officially discharged with respect to the left 3rd toe 
amputation procedure.  She is healed.  



The patient was encouraged strongly to see Dr. Nieto or Dr. Montalvo as soon as 
possible regarding blood pressure being elevated, as well as lower extremity 
swelling bilaterally.  There is no real concern for a DVT at  this time, as 
there is no pain in the calf, nor really cellulitis or erythema.  The patient 
states that she will try and get in with Dr. Montalvo or have a task given to 
the nurses so that they can adjust blood pressure medication as directed by the 
provider this week. 



We discussed the importance of conservative management at this time, and we will
continue aggressive treatment of the left 2nd toe distal tip wound so that we 
can get it all healed.   The patient was encouraged to obtain more Crest pads as
able, as she has another supplier that can get them cheaper for her.  



The patient will return to clinic in 1 week.  We will assess how her left 2nd 
toe distal tip is doing and continue doing debridement as needed.  Once we heal 
this wound, we will determine whether or not we will continue surgical treatment
discussion versus continuing conservative treatment.  



cc:   Dr. Raphael APARICIO

## 2018-11-19 ENCOUNTER — HOSPITAL ENCOUNTER (OUTPATIENT)
Dept: HOSPITAL 44 - POD | Age: 78
End: 2018-11-19
Attending: PODIATRIST
Payer: MEDICARE

## 2018-11-19 DIAGNOSIS — M20.42: ICD-10-CM

## 2018-11-19 DIAGNOSIS — L97.521: Primary | ICD-10-CM

## 2018-11-19 DIAGNOSIS — R60.9: ICD-10-CM

## 2018-11-19 DIAGNOSIS — L81.8: ICD-10-CM

## 2018-11-19 DIAGNOSIS — L85.9: ICD-10-CM

## 2018-11-19 DIAGNOSIS — R20.2: ICD-10-CM

## 2018-11-19 DIAGNOSIS — E11.9: ICD-10-CM

## 2018-11-19 DIAGNOSIS — M20.41: ICD-10-CM

## 2018-11-19 PROCEDURE — 11042 DBRDMT SUBQ TIS 1ST 20SQCM/<: CPT

## 2018-11-19 PROCEDURE — 11055 PARING/CUTG B9 HYPRKER LES 1: CPT

## 2018-11-23 NOTE — OP CLINIC PROGRESS NOTE
SUBJECTIVE:

Sydnie Foy is an 78-year-old female who presents today to the clinic for 
follow up of left 2nd toe wound.  The patient has been utilizing antibiotic 
ointment and a Band-Aid daily.  She is also using Crest pads to bilateral feet 
to protect the hammertoes.  The patient denies any other concerns or pain.  The 
patient does not admit to any fevers, chills, nausea, vomiting, shortness of 
breath or chest pain at this time. 



OBJECTIVE:

VITAL SIGNS:  T: 97.1 degrees Fahrenheit, pulse oximetry is 97%, heart rate 76, 
R: 18, BP:  135/71.  

VASCULAR:  There 2+ DP and PT pulses of left and right feet.  Capillary refill 
time is less than 3 seconds to the toes bilaterally.  There is mild edema still 
noted of the bilateral lower extremities.  

DERMATOLOGIC:  There is a toe ulcer visualized on the tip of the left 2nd toe.  
It appears that the wound is now undermining in the plantar proximal direction 
from the wound. This was opened with debridement today to allow appropriate 
healing.  Probe is definitely deeper today and getting nearer to bone but not 
quite probing to bone.  There is no erythema, purulence, or malodor at this 
time.  There is also a hyperkeratosis noted at the distal tip of the right 4th 
toe.  This was debrided today with a #15 blade to intact epithelium.  

There are no other open lesions or skin lesions.  There is mild darkening 
discoloration just proximal to the left 3rd toe surgical site where the 
amputation took place.  This is just due to postsurgical changes I believe. 

MUSCULOSKELETAL:  There is no pain on palpation noted to bilateral feet.  There 
is a digital contracture noted to the left 2nd and 4th toes, as well as the 
right 2nd, 3rd, and 4th toes.  These essentially have mallet deformities.  

NEUROLOGIC:  Light touch sensation is absent to the toes of the left foot.  The 
patient does not feel any pain with debridement.  



ASSESSMENT:

1.   Open ulcer of left 2nd toe. 

2.   Hyperkeratosis of right 3rd toe. 

3.   Hammertoes of bilateral feet. 

4.   Diabetes mellitus type 2. 



PROCEDURE #1:

Debridement of skin and soft tissue lesion of the left 2nd toe distal tip with a
#15 blade down to and including the subcutaneous tissue measuring 0.6 x 0.4 x 
0.4 cm deep.  This is definitely worse since her last visit.  Hemostasis was 
obtained via pressure and silver nitrate today.  Triple antibiotic ointment and 
a Band-Aid were applied.  The patient will continue triple antibiotic ointment 
and a Band-Aid to this site daily.  She will also use a Crest pad continually to
the left and right foot.  The patient tolerated the procedure well.  The patient
was advised to begin using a surgical shoe to the left foot at all times 
weightbearing.  I will adjust the surgical shoe when she comes into the office 
next week to offload the left 2nd toe as it is worsening from what I believe is 
pressure.  



PROCEDURE #2:

Debridement of  hyperkeratosis of right 4th toe distal tip with a #15 blade to 
reveal intact epithelium.   The patient is to continue using a Crest pad on the 
right foot as well.  The patient tolerated the procedure well. 



PLAN:

Return in 1 week to the outpatient clinic for debridement and follow up of the 
left 2nd toe ulcer.  The patient saw Dr. Montalvo in his office and no changes 
in blood pressure with regards to her last appointment.  The patient is still 
working on obtaining Crest pads to replace the ones that she is currently using 
bilaterally.  



Again, the patient will return in 1 week and we will continue working on wound 
debridement and make adjustments to her surgical shoe to offload the left 2nd 
toe.  Once these are closed, as this is the second time this wound has opened 
since I have seen her, we will likely plan for surgical arthrodesis of the left 
2nd toe PIPJ.    





cc:   Dr. Raphael APARICIO

## 2019-02-25 ENCOUNTER — HOSPITAL ENCOUNTER (OUTPATIENT)
Dept: HOSPITAL 44 - LAB | Age: 79
End: 2019-02-25
Attending: FAMILY MEDICINE
Payer: MEDICARE

## 2019-02-25 DIAGNOSIS — E11.9: Primary | ICD-10-CM

## 2019-02-25 LAB — EGFR (NON-AFRICAN): 37

## 2019-02-25 PROCEDURE — 80053 COMPREHEN METABOLIC PANEL: CPT

## 2019-02-25 PROCEDURE — 36415 COLL VENOUS BLD VENIPUNCTURE: CPT

## 2019-02-25 PROCEDURE — 83036 HEMOGLOBIN GLYCOSYLATED A1C: CPT

## 2019-02-25 PROCEDURE — 80061 LIPID PANEL: CPT

## 2022-07-31 NOTE — OP CLINIC PROGRESS NOTE
SUBJECTIVE:

Sydnie Foy is an 78-year-old female presenting for her second post-op 
appointment since her date of surgery, October 1, 2018, where she underwent a 
left 3rd toe amputation.  The patient has had a fall over the course of this 
last week-and-a-half since I last saw her and in the ER, she had her dressings 
removed to check her foot and they were re-applied again there.  She says they 
fell off one other time and she re-applied them herself with her family.  She 
denies any concerns with her foot at this time.  She is not complaining of much 
pain to me in her foot at this time.  She is happy with the improved appearance 
with the bruising coming down.  The patient is concerned, as well as her 
daughter, that her dressings were too thick causing her foot to slip out of her 
surgical shoe last time and catch on something causing her to fall.  This was 
evaluated today and with smaller dressings at the end of the visit, she was much
better fitting in her surgical shoe and I felt comfortable with the size of her 
shoe at this time with her toes barely reaching the edge of the shoe.  The 
patient does not admit to any fever, chills, nausea, vomiting, shortness of 
breath or chest pain at this time.  



Her past medical history includes chronic atrial fibrillation, CHF, essential 
hypertension, Sjogren's Syndrome, and diabetes mellitus type 2.



OBJECTIVE:

Vital Signs:  T: 97.6 degrees, R:  16, BP:  137/76, heart rate 90, pain is 7 out
of 10.

Vascular:   DP and PT pulses are at 2+ of the left foot.  The capillary refill 
time is less than 3 seconds of the left foot.  There is mild edema noted to the 
forefoot with minimal ecchymosis, much improved since last time.  There is no 
bleeding at this time.  

Dermatologic:  There is mild ecchymosis noted with no erythema of the left foot.
 The sutures appear to have allowed beautiful healing centrally but with 
slightly more healing to go on the medial side of where the toe was amputated 
and there is very minimal more to go on the lateral side.  Sutures were left 
intact medially and laterally but the central 2 sutures were removed.  There was
no purulence or malodor or signs of infection at this time.   Hyperkeratosis 
noted at the distal tip of the left 2nd toe distal phalanx which was debrided to
intact fragile epithelium today with a #15 blade. 

Musculoskeletal:   There is mild pain on palpation noted at the left 3rd toe 
amputation site.  There are digital contractures of note and discussed of the 
left 2nd toe, as well as left 4th and 5th toes.  

Neurologic:   There is light touch sensation intact to the toes of the left 
foot. 



ASSESSMENT:

1.   Aftercare following surgery.

2.   Previous osteomyelitis of the left 3rd toe.

3.   Healing surgical site.

4.   Hammertoes of the left foot, toes #2, #4, and #5. 

5.   Hyperkeratosis of left 2nd toe.



PROCEDURE #1:

Part of aftercare following surgery under the global, 2 sutures were removed 
centrally from the left 3rd toe amputation.  The remaining sutures were left 
intact and dressed with 4 x 4 gauze and 4-inch Kerlix.   



PROCEDURE #2:

Hyperkeratosis of left 2nd toe debrided with a #15 blade down to intact fragile 
epithelium.  Due to it being red adjacent the 3rd toe amputation site where 
there was a slight opening still along the medial edge, we will hold off on a 
Crest pad at this time, as this will probably rub right in that location.  We 
will just monitor this carefully.  



We discussed the idea and plan to completely heal this wound still.  She is not 
able to use the shower at this time, as there are still sutures in there.  She 
also is not advised to bathe at this time.  We discussed the importance of 
healing this wound and as the left 2nd toe has now healed giving it a little bit
more time to have less fragile skin and at her next visit, we will discuss 
possible surgical correction of the left 2nd, 4th, and 5th toes depending on 
which one she needs corrected.  She obviously has had an open wound now and 
hyperkeratosis and pain to the left 2nd toe, I am also concerned that her left 
4th and 5th toes would be of concern if they are not straightened at some point.
 The patient will consider these options and knows that my biggest priority is 
the left 2nd toe where there is a hyperkeratosis and previous wound.  We 
discussed possible surgical correction of the left 2nd toe with arthroplasty.  
We may consider arthrodesis depending on the patient also would like.  



PLAN: 

Return to clinic in 1 week and may remove the remaining sutures hopefully.  We 
will assess her wound at that time and see if she can begin showering the next 
day or not.  The next big step is to discuss surgical correction of at least her
left 2nd hammertoe, which I believe is completely necessary due to her history 
of the callus and wound.  She will have a hard time wearing a pad the rest of 
her life and this will be a great procedure for her.  





cc:   Dr. Raphael APARICIO
abnormal fetal heart rate tracing/peripartum hemorrhage